# Patient Record
Sex: MALE | Race: WHITE | NOT HISPANIC OR LATINO | URBAN - METROPOLITAN AREA
[De-identification: names, ages, dates, MRNs, and addresses within clinical notes are randomized per-mention and may not be internally consistent; named-entity substitution may affect disease eponyms.]

---

## 2018-04-25 ENCOUNTER — OUTPATIENT (OUTPATIENT)
Dept: OUTPATIENT SERVICES | Facility: HOSPITAL | Age: 53
LOS: 1 days | End: 2018-04-25
Payer: COMMERCIAL

## 2018-04-25 ENCOUNTER — APPOINTMENT (OUTPATIENT)
Dept: ULTRASOUND IMAGING | Facility: CLINIC | Age: 53
End: 2018-04-25
Payer: COMMERCIAL

## 2018-04-25 DIAGNOSIS — Z00.8 ENCOUNTER FOR OTHER GENERAL EXAMINATION: ICD-10-CM

## 2018-04-25 PROCEDURE — 76700 US EXAM ABDOM COMPLETE: CPT

## 2018-04-25 PROCEDURE — 76700 US EXAM ABDOM COMPLETE: CPT | Mod: 26

## 2019-08-02 ENCOUNTER — APPOINTMENT (OUTPATIENT)
Dept: RHEUMATOLOGY | Facility: CLINIC | Age: 54
End: 2019-08-02
Payer: COMMERCIAL

## 2019-08-02 VITALS
OXYGEN SATURATION: 99 % | BODY MASS INDEX: 31.33 KG/M2 | DIASTOLIC BLOOD PRESSURE: 82 MMHG | HEIGHT: 75 IN | HEART RATE: 71 BPM | WEIGHT: 252 LBS | SYSTOLIC BLOOD PRESSURE: 120 MMHG

## 2019-08-02 DIAGNOSIS — Z82.61 FAMILY HISTORY OF ARTHRITIS: ICD-10-CM

## 2019-08-02 DIAGNOSIS — Z80.9 FAMILY HISTORY OF MALIGNANT NEOPLASM, UNSPECIFIED: ICD-10-CM

## 2019-08-02 DIAGNOSIS — Z82.49 FAMILY HISTORY OF ISCHEMIC HEART DISEASE AND OTHER DISEASES OF THE CIRCULATORY SYSTEM: ICD-10-CM

## 2019-08-02 DIAGNOSIS — E03.9 HYPOTHYROIDISM, UNSPECIFIED: ICD-10-CM

## 2019-08-02 PROCEDURE — 99204 OFFICE O/P NEW MOD 45 MIN: CPT

## 2019-08-02 RX ORDER — LEVOTHYROXINE SODIUM 0.17 MG/1
TABLET ORAL
Refills: 0 | Status: ACTIVE | COMMUNITY

## 2019-08-02 RX ORDER — INDOMETHACIN 50 MG/1
CAPSULE ORAL
Refills: 0 | Status: ACTIVE | COMMUNITY

## 2019-08-02 NOTE — ASSESSMENT
[FreeTextEntry1] : 55 y/o man with hx of hypothyroidism, gout previously on allopurinol but with adverse reaction/elevated liver transaminases.  He has been treating his gout flares with indomethacin PRN.  He does not want to start another urate lowering medication at this point, but we have discussed the possibility of uloric.  \par \par Plan:\par -Check labs including uric acid\par -XRs b/L feet to look for erosive change\par -Rx colchicine 0.6mg tablet to use only at onset of gout attack.  He has been educated on how to take this.  R/B/A's discussed.\par -Consider uloric, though I have informed him that hepatotoxicity may also occur and risks of drugs vs. benefits must be carefully considered.  We have also discussed the cardiovascular warning issued on uloric.  \par -Diet counseling employed\par -f/u 3 months

## 2019-08-02 NOTE — CONSULT LETTER
[Dear  ___] : Dear  [unfilled], [Consult Letter:] : I had the pleasure of evaluating your patient, [unfilled]. [Please see my note below.] : Please see my note below. [Consult Closing:] : Thank you very much for allowing me to participate in the care of this patient.  If you have any questions, please do not hesitate to contact me. [Sincerely,] : Sincerely, [FreeTextEntry3] : Dyana Gerardo MD

## 2019-08-02 NOTE — HISTORY OF PRESENT ILLNESS
[FreeTextEntry1] : This is a 55 y/o man with hx including thyroid disease, possible bursitis, and gout.\par \par He had been on allopurinol and messed up his liver.  He has scarring on his liver because of it.  He prefers not to be on a similar gout medication like allopurinol for now.  \par He gets gout every 6-8 weeks, and it goes away after 2 weeks.  He gets it in the right big toe and left big toe/dorsum, and right elbow.  Toes are fine between episodes.  \par When it comes on, he takes indomethacin for about 2 days and can usually nip it in the bud.  \par \par He has right knee drained in the past.  Does not think it was tested for crystals.  He received cortisone shot at that time.  \par \par \par AST/ALT was 170-180 range when he was on the allopurinol 100mg.  It was checked one year after starting allopurinol.  \par \par He denies alcohol\par He avoids red meat, shellfish\par \par He has intentional weight loss of 40-60 pounds.  \par \par No hx of eye inflammation\par No hyx of psoriasis/IBD

## 2019-08-12 ENCOUNTER — MEDICATION RENEWAL (OUTPATIENT)
Age: 54
End: 2019-08-12

## 2019-08-13 ENCOUNTER — OTHER (OUTPATIENT)
Age: 54
End: 2019-08-13

## 2019-10-07 ENCOUNTER — RX RENEWAL (OUTPATIENT)
Age: 54
End: 2019-10-07

## 2019-11-01 LAB
ALBUMIN SERPL ELPH-MCNC: 4.7 G/DL
ALP BLD-CCNC: 45 U/L
ALT SERPL-CCNC: 21 U/L
ANION GAP SERPL CALC-SCNC: 13 MMOL/L
AST SERPL-CCNC: 20 U/L
BASOPHILS # BLD AUTO: 0.04 K/UL
BASOPHILS NFR BLD AUTO: 0.9 %
BILIRUB SERPL-MCNC: 0.9 MG/DL
BUN SERPL-MCNC: 11 MG/DL
CALCIUM SERPL-MCNC: 9.8 MG/DL
CHLORIDE SERPL-SCNC: 103 MMOL/L
CO2 SERPL-SCNC: 25 MMOL/L
CREAT SERPL-MCNC: 1.03 MG/DL
EOSINOPHIL # BLD AUTO: 0.18 K/UL
EOSINOPHIL NFR BLD AUTO: 3.9 %
GLUCOSE SERPL-MCNC: 92 MG/DL
HCT VFR BLD CALC: 46.5 %
HGB BLD-MCNC: 15.2 G/DL
IMM GRANULOCYTES NFR BLD AUTO: 0.2 %
LYMPHOCYTES # BLD AUTO: 1.71 K/UL
LYMPHOCYTES NFR BLD AUTO: 37.3 %
MAN DIFF?: NORMAL
MCHC RBC-ENTMCNC: 30.3 PG
MCHC RBC-ENTMCNC: 32.7 GM/DL
MCV RBC AUTO: 92.8 FL
MONOCYTES # BLD AUTO: 0.37 K/UL
MONOCYTES NFR BLD AUTO: 8.1 %
NEUTROPHILS # BLD AUTO: 2.28 K/UL
NEUTROPHILS NFR BLD AUTO: 49.6 %
PLATELET # BLD AUTO: 206 K/UL
POTASSIUM SERPL-SCNC: 5.3 MMOL/L
PROT SERPL-MCNC: 7.3 G/DL
RBC # BLD: 5.01 M/UL
RBC # FLD: 13.1 %
SODIUM SERPL-SCNC: 141 MMOL/L
URATE SERPL-MCNC: 8.4 MG/DL
WBC # FLD AUTO: 4.59 K/UL

## 2019-11-08 ENCOUNTER — APPOINTMENT (OUTPATIENT)
Dept: RHEUMATOLOGY | Facility: CLINIC | Age: 54
End: 2019-11-08
Payer: COMMERCIAL

## 2019-11-08 VITALS
SYSTOLIC BLOOD PRESSURE: 132 MMHG | OXYGEN SATURATION: 99 % | BODY MASS INDEX: 31.46 KG/M2 | HEIGHT: 75 IN | DIASTOLIC BLOOD PRESSURE: 84 MMHG | WEIGHT: 253 LBS | HEART RATE: 55 BPM

## 2019-11-08 PROCEDURE — 99213 OFFICE O/P EST LOW 20 MIN: CPT

## 2019-11-08 NOTE — ASSESSMENT
[FreeTextEntry1] : 53 y/o man with hx of hypothyroidism, gout previously on allopurinol but with adverse reaction/elevated liver transaminases. He has been treating his gout flares with indomethacin PRN. He does not want to start another urate lowering medication at this point, but we again have discussed the possibility of uloric. \par \par Plan:\par -11/1/19 labs reviewed cbc, cmp WNL.  Uric acid is elevated at 8.4.  \par -XRs b/L feet to look for erosive change--> he still needs to do this.  I explained purpose of XR test.\par -Rx colchicine 0.6mg tablet to use only at onset of gout attack. He has been educated on how to take this. R/B/A's discussed.--> He has not used this yet either.  He states he will try it if his gout comes on during a saturday or sunday.  \par -Counseled on NSAID use and potential side effects\par -Consider uloric, though I have informed him that hepatotoxicity may also occur and risks of drugs vs. benefits must be carefully considered. We have also discussed the cardiovascular warning issued on uloric. \par -Diet counseling employed\par -f/u 4 months. \par \par

## 2019-11-08 NOTE — HISTORY OF PRESENT ILLNESS
[FreeTextEntry1] : This is a 55 y/o man with hx including thyroid disease, possible bursitis, and gout.\par \par He had been on allopurinol and messed up his liver. He has scarring on his liver because of it. He prefers not to be on a similar gout medication like allopurinol for now. \par He initially reported he had recurrent gout every 6-8 weeks, and it would go away after 2 weeks. He has had it in the right big toe and left big toe/dorsum, and right elbow. \par Since last visit 3 months ago, he has had left anterior knee bursa pain and inflammation, and left ankle/foot pain/swelling.  \par When it comes on, he takes indomethacin for about 2 days and can usually nip it in the bud. \par He did not try the colchicine as he was worried about diarrhea at work.  \par \par He has right knee drained in the past. Does not think it was tested for crystals. He received cortisone shot at that time. \par \par \par AST/ALT was 170-180 range when he was on the allopurinol 100mg. It was checked one year after starting allopurinol. \par His AST/ALT is currently normal.  \par \par He denies alcohol\par He avoids red meat, shellfish\par \par He has intentional weight loss of 40-60 pounds. \par \par No hx of eye inflammation\par No hyx of psoriasis/IBD \par  \par 11/1/19 CBC, CMP unremarkable\par Uric acid 8.4

## 2020-07-31 ENCOUNTER — APPOINTMENT (OUTPATIENT)
Dept: RHEUMATOLOGY | Facility: CLINIC | Age: 55
End: 2020-07-31
Payer: COMMERCIAL

## 2020-07-31 VITALS
WEIGHT: 258 LBS | DIASTOLIC BLOOD PRESSURE: 72 MMHG | SYSTOLIC BLOOD PRESSURE: 102 MMHG | HEIGHT: 75 IN | OXYGEN SATURATION: 98 % | BODY MASS INDEX: 32.08 KG/M2 | TEMPERATURE: 96.1 F | HEART RATE: 58 BPM

## 2020-07-31 PROCEDURE — 99214 OFFICE O/P EST MOD 30 MIN: CPT

## 2020-07-31 RX ORDER — COLCHICINE 0.6 MG/1
0.6 TABLET ORAL
Qty: 30 | Refills: 0 | Status: DISCONTINUED | COMMUNITY
Start: 2019-08-02 | End: 2020-07-31

## 2020-07-31 NOTE — HISTORY OF PRESENT ILLNESS
[FreeTextEntry1] : This is a 56 y/o man with hx including thyroid disease, possible bursitis, and gout.\par \par Since last visit he has had an attack right hand 2-4th MCPs, right knee, and then 2 days ago in his left foot instep.\par When it comes on, he takes indomethacin for about 2 days and can usually nip it in the bud. \par He did not try the colchicine as he was worried about diarrhea at work. \par He was started on uloric about 4 weeks ago by his pcp after a string of recurrent gout attacks.\par His uric acid most recently is now 5.7 7/2020.  \par \par \par He had previously been on allopurinol and reports he has scarring on his liver because of it. He preferred not to be on a similar gout medication initially when I first met him.\par He initially reported he had recurrent gout every 6-8 weeks, and it would go away after 2 weeks. \par He has right knee drained in the past. Does not think it was tested for crystals. He received cortisone shot at that time. \par \par \par AST/ALT was previously 170-180 range when he was on the allopurinol 100mg. It was checked one year after starting allopurinol. \par His AST/ALT is currently normal. \par \par He denies alcohol\par He avoids red meat, shellfish\par \par He has intentional weight loss of 40-60 pounds. \par \par No hx of eye inflammation\par No hyx of psoriasis/IBD \par  \par 11/1/19 CBC, CMP unremarkable\par Uric acid 8.4 \par  \par

## 2020-07-31 NOTE — ASSESSMENT
[FreeTextEntry1] : 54 y/o man with hx of hypothyroidism, gout previously on allopurinol but with adverse reaction/elevated liver transaminases. He has been treating his gout flares with indomethacin PRN.  He recently had another flareup, this time in left instep 2 days ago.  \par \par Plan:\par -Take colchicine BID today.  Take daily thereafter until gout attack resolved.  \par -Can use the indomethacin 75mg ER as needed, but cautioned him about liver and kidney as well as gastric effects.  \par -7/2020 albs reviewed, LFTs WNL.  Uric acid <6. \par -XRs b/L feet to look for erosive change--> he still needs to do this. I explained purpose of XR test.\par -Rx colchicine 0.6mg tablet to use only at onset of gout attack. He has been educated on how to take this. R/B/A's discussed.--> He has not used this yet either. He states he will try it if his gout comes on during a saturday or sunday. \par -Counseled on NSAID use and potential side effects\par -Continue uloric 40mg daily for now . Check uric acid again in 6 weeks.\par -Check LFTs again in 6 weeks given hepatic history.\par -US abd 7/13/20 reviewed.  Coarse echotexture of liver may relate to fatty infiltration or hepatocellular disease.  No significant changes compared to Abd US 5/3/2014. \par -Diet counseling employed\par -f/u 7 weeks\par \par

## 2020-09-17 LAB
ALBUMIN SERPL ELPH-MCNC: 5.1 G/DL
ALP BLD-CCNC: 46 U/L
ALT SERPL-CCNC: 55 U/L
ANION GAP SERPL CALC-SCNC: 13 MMOL/L
AST SERPL-CCNC: 33 U/L
BILIRUB SERPL-MCNC: 0.8 MG/DL
BUN SERPL-MCNC: 15 MG/DL
CALCIUM SERPL-MCNC: 9.9 MG/DL
CHLORIDE SERPL-SCNC: 102 MMOL/L
CO2 SERPL-SCNC: 25 MMOL/L
CREAT SERPL-MCNC: 1.02 MG/DL
GLUCOSE SERPL-MCNC: 92 MG/DL
POTASSIUM SERPL-SCNC: 4.8 MMOL/L
PROT SERPL-MCNC: 7.7 G/DL
SODIUM SERPL-SCNC: 140 MMOL/L
URATE SERPL-MCNC: 4.8 MG/DL

## 2020-09-18 ENCOUNTER — APPOINTMENT (OUTPATIENT)
Dept: RHEUMATOLOGY | Facility: CLINIC | Age: 55
End: 2020-09-18
Payer: COMMERCIAL

## 2020-09-18 ENCOUNTER — APPOINTMENT (OUTPATIENT)
Dept: RADIOLOGY | Facility: CLINIC | Age: 55
End: 2020-09-18
Payer: COMMERCIAL

## 2020-09-18 ENCOUNTER — OUTPATIENT (OUTPATIENT)
Dept: OUTPATIENT SERVICES | Facility: HOSPITAL | Age: 55
LOS: 1 days | End: 2020-09-18
Payer: COMMERCIAL

## 2020-09-18 VITALS
BODY MASS INDEX: 32.5 KG/M2 | WEIGHT: 260 LBS | TEMPERATURE: 97.9 F | DIASTOLIC BLOOD PRESSURE: 78 MMHG | SYSTOLIC BLOOD PRESSURE: 124 MMHG | HEART RATE: 63 BPM | OXYGEN SATURATION: 98 %

## 2020-09-18 DIAGNOSIS — Z00.8 ENCOUNTER FOR OTHER GENERAL EXAMINATION: ICD-10-CM

## 2020-09-18 PROCEDURE — 73130 X-RAY EXAM OF HAND: CPT | Mod: 26,50

## 2020-09-18 PROCEDURE — 73630 X-RAY EXAM OF FOOT: CPT

## 2020-09-18 PROCEDURE — 73630 X-RAY EXAM OF FOOT: CPT | Mod: 26,50

## 2020-09-18 PROCEDURE — 73130 X-RAY EXAM OF HAND: CPT

## 2020-09-18 PROCEDURE — 99214 OFFICE O/P EST MOD 30 MIN: CPT

## 2020-09-18 NOTE — HISTORY OF PRESENT ILLNESS
[FreeTextEntry1] : This is a 54 y/o man with hx including thyroid disease, possible bursitis, and gout.\par \par Since last visit he has had an attack right hand 2-4th MCPs after putting pool covers on.  He took colchicine and then another one an hour later. \par He took an indomethacin.\par After that it felt better.  He then had some activity in left 3rd MCP.  \par \par He denies any issue with colchicine.  \par most recent Uric acid was 4.8\par ALT was 55\par \par not to be on a similar gout medication initially when I first met him.\par He initially reported he had recurrent gout every 6-8 weeks, and it would go away after 2 weeks. \par He has right knee drained in the past. Does not think it was tested for crystals. He received cortisone shot at that time. \par \par \par AST/ALT was previously 170-180 range when he was on the allopurinol 100mg. It was checked one year after starting allopurinol. \par His AST/ALT is currently normal. \par \par He denies alcohol\par He avoids red meat, shellfish\par \par He has intentional weight loss of 40-60 pounds. \par \par No hx of eye inflammation\par No hyx of psoriasis/IBD \par  \par \par \par \par

## 2020-09-18 NOTE — ASSESSMENT
[FreeTextEntry1] : 54 y/o man with hx of hypothyroidism, gout previously on allopurinol but with adverse reaction/elevated liver transaminases. His gout frequency has improved thus far, though he is still getting attacks.  \par \par Plan:\par -Continue colchicine PRN use.  He does not want to use it daily at this point.   \par -Can use the indomethacin 75mg ER as needed, but cautioned him about liver and kidney as well as gastric effects. \par -9/16/20 labs reviewed, AST elevated. Uric acid <6. \par -XRs b/L hands and feet to look for erosive change--> he still needs to do this. I explained purpose of XR test.\par -Counseled on NSAID use and potential side effects\par -Continue uloric 40mg daily for now. Check uric acid again in 6 weeks.\par -Check LFTs again in 3 weeks given hepatic history.\par -US abd 7/13/20 reviewed. Coarse echotexture of liver may relate to fatty infiltration or hepatocellular disease. No significant changes compared to Abd US 5/3/2014. \par -Diet counseling employed\par -f/u 3 months in office\par -telephonic visit in 3.5 weeks (after labs and XRs done)\par \par . \par

## 2020-09-20 ENCOUNTER — TRANSCRIPTION ENCOUNTER (OUTPATIENT)
Age: 55
End: 2020-09-20

## 2020-10-15 ENCOUNTER — APPOINTMENT (OUTPATIENT)
Dept: RHEUMATOLOGY | Facility: CLINIC | Age: 55
End: 2020-10-15
Payer: COMMERCIAL

## 2020-10-15 LAB
25(OH)D3 SERPL-MCNC: 55.4 NG/ML
ALBUMIN SERPL ELPH-MCNC: 4.9 G/DL
ALP BLD-CCNC: 49 U/L
ALT SERPL-CCNC: 55 U/L
ANION GAP SERPL CALC-SCNC: 12 MMOL/L
AST SERPL-CCNC: 32 U/L
BILIRUB SERPL-MCNC: 0.6 MG/DL
BUN SERPL-MCNC: 12 MG/DL
CALCIUM SERPL-MCNC: 9.8 MG/DL
CHLORIDE SERPL-SCNC: 104 MMOL/L
CO2 SERPL-SCNC: 26 MMOL/L
CREAT SERPL-MCNC: 0.9 MG/DL
GLUCOSE SERPL-MCNC: 103 MG/DL
POTASSIUM SERPL-SCNC: 4.8 MMOL/L
PROT SERPL-MCNC: 7.7 G/DL
SODIUM SERPL-SCNC: 142 MMOL/L
URATE SERPL-MCNC: 6.9 MG/DL

## 2020-10-15 PROCEDURE — 99441: CPT

## 2020-12-18 ENCOUNTER — APPOINTMENT (OUTPATIENT)
Dept: RHEUMATOLOGY | Facility: CLINIC | Age: 55
End: 2020-12-18

## 2021-02-09 LAB
ALP BLD-CCNC: 47 U/L
ALT SERPL-CCNC: 57 U/L
AST SERPL-CCNC: 32 U/L
GGT SERPL-CCNC: 18 U/L
URATE SERPL-MCNC: 6.4 MG/DL

## 2021-03-16 LAB
ALT SERPL-CCNC: 47 U/L
AST SERPL-CCNC: 28 U/L

## 2021-04-20 ENCOUNTER — APPOINTMENT (OUTPATIENT)
Dept: RHEUMATOLOGY | Facility: CLINIC | Age: 56
End: 2021-04-20

## 2021-06-01 LAB
ALT SERPL-CCNC: 45 U/L
AST SERPL-CCNC: 27 U/L
URATE SERPL-MCNC: 6.2 MG/DL

## 2021-09-02 ENCOUNTER — RX RENEWAL (OUTPATIENT)
Age: 56
End: 2021-09-02

## 2021-10-22 LAB
ALBUMIN SERPL ELPH-MCNC: 4.9 G/DL
ALP BLD-CCNC: 51 U/L
ALT SERPL-CCNC: 52 U/L
ANION GAP SERPL CALC-SCNC: 14 MMOL/L
AST SERPL-CCNC: 29 U/L
BILIRUB SERPL-MCNC: 0.6 MG/DL
BUN SERPL-MCNC: 11 MG/DL
CALCIUM SERPL-MCNC: 9.6 MG/DL
CHLORIDE SERPL-SCNC: 103 MMOL/L
CO2 SERPL-SCNC: 23 MMOL/L
CREAT SERPL-MCNC: 0.98 MG/DL
GLUCOSE SERPL-MCNC: 85 MG/DL
POTASSIUM SERPL-SCNC: 4.9 MMOL/L
PROT SERPL-MCNC: 7.3 G/DL
SODIUM SERPL-SCNC: 140 MMOL/L
URATE SERPL-MCNC: 6 MG/DL

## 2021-11-16 ENCOUNTER — APPOINTMENT (OUTPATIENT)
Dept: RHEUMATOLOGY | Facility: CLINIC | Age: 56
End: 2021-11-16
Payer: COMMERCIAL

## 2021-11-16 VITALS
BODY MASS INDEX: 33.12 KG/M2 | HEART RATE: 67 BPM | WEIGHT: 265 LBS | DIASTOLIC BLOOD PRESSURE: 94 MMHG | TEMPERATURE: 97.9 F | SYSTOLIC BLOOD PRESSURE: 141 MMHG | OXYGEN SATURATION: 97 %

## 2021-11-16 DIAGNOSIS — E79.0 HYPERURICEMIA W/OUT SIGNS OF INFLAMMATORY ARTHRITIS AND TOPHACEOUS DISEASE: ICD-10-CM

## 2021-11-16 DIAGNOSIS — R74.01 ELEVATION OF LEVELS OF LIVER TRANSAMINASE LEVELS: ICD-10-CM

## 2021-11-16 PROCEDURE — 99214 OFFICE O/P EST MOD 30 MIN: CPT

## 2021-11-16 NOTE — HISTORY OF PRESENT ILLNESS
[FreeTextEntry1] : 55 y/o man with hx including thyroid disease, possible bursitis, and gout.\par \par He had an attack right 1st toe.  He took indomethacin 2 tablets total daily for 3 days and it resolved.  \par That was in october 2021.  Prior to that he hadn't had any attacks for almost a year, with the exception of possibly one small twinge.  \par \par He denies any issue with colchicine. \par most recent Uric acid was 4.8\par ALT was 55\par \par \par He initially reported he had recurrent gout every 6-8 weeks, and it would go away after 2 weeks. \par He has right knee drained in the past. Does not think it was tested for crystals. He received cortisone shot at that time. \par \par \par AST/ALT was previously 170-180 range when he was on the allopurinol 100mg. It was checked one year after starting allopurinol. \par \par He denies alcohol\par He avoids red meat, shellfish\par \par He has intentional weight loss of 40-60 pounds. \par \par No hx of eye inflammation\par No hyx of psoriasis/IBD \par  \par

## 2021-11-16 NOTE — ASSESSMENT
[FreeTextEntry1] : 55 y/o man with hx of hypothyroidism, gout previously on allopurinol but with adverse reaction/elevated liver transaminases. His gout frequency has improved thus far.  He had one flareup last month which resolved with indomethacin for 3 days.   \par \par Plan:\par -Continue colchicine PRN use. He does not want to use it daily at this point. \par -Can use the indomethacin 75mg ER as needed, but cautioned him about liver and kidney as well as gastric effects. \par -10/2021 labs reviewed, AST elevated. Uric acid is 6.  \par -XRs b/L hands and feet negative for erosive change.  \par -Counseled on NSAID use and potential side effects\par -Continue uloric 40mg daily for now. Check uric acid again in 6 weeks.\par -Check LFTs again. If LFTs improved, would inc uloric to MWFSunday.  If LFTs the same or higher (only because of recent singular flare), would continue at MWF.  \par -US abd 7/13/20 reviewed. Coarse echotexture of liver may relate to fatty infiltration or hepatocellular disease. No significant changes compared to Abd US 5/3/2014. \par -Diet counseling employed\par -f/u 6 months in office\par \par \par

## 2022-05-03 LAB
ALT SERPL-CCNC: 40 U/L
AST SERPL-CCNC: 26 U/L
URATE SERPL-MCNC: 5.2 MG/DL

## 2022-05-05 ENCOUNTER — APPOINTMENT (OUTPATIENT)
Dept: RHEUMATOLOGY | Facility: CLINIC | Age: 57
End: 2022-05-05
Payer: COMMERCIAL

## 2022-05-05 VITALS
DIASTOLIC BLOOD PRESSURE: 80 MMHG | SYSTOLIC BLOOD PRESSURE: 120 MMHG | BODY MASS INDEX: 32.08 KG/M2 | HEART RATE: 62 BPM | OXYGEN SATURATION: 96 % | WEIGHT: 258 LBS | TEMPERATURE: 97.3 F | HEIGHT: 75 IN

## 2022-05-05 PROCEDURE — 99214 OFFICE O/P EST MOD 30 MIN: CPT

## 2022-05-05 RX ORDER — INDOMETHACIN 75 MG/1
75 CAPSULE, EXTENDED RELEASE ORAL
Qty: 30 | Refills: 0 | Status: ACTIVE | COMMUNITY
Start: 2020-07-31 | End: 1900-01-01

## 2022-05-05 NOTE — ASSESSMENT
[FreeTextEntry1] : 55 y/o man with hx of hypothyroidism, gout previously on allopurinol but with adverse reaction/elevated liver transaminases. He has been treating his gout flares with indomethacin PRN. He recently had another a very tiny twinge in left 1st toe.  \par \par \par Plan:\par -Continue colchicine PRN use. He does not want to use it daily at this point. \par -Refill the indomethacin 75mg ER as needed, but cautioned him about liver and kidney as well as gastric effects. \par -10/2021 labs reviewed, AST elevated. Uric acid is 5.2. \par -XRs b/L hands and feet negative for erosive change. \par -Counseled on NSAID use and potential side effects\par -Continue uloric 40mg QOD for now. \par -US abd 7/13/20 reviewed. Coarse echotexture of liver may relate to fatty infiltration or hepatocellular disease. No significant changes compared to Abd US 5/3/2014. \par -Diet counseling employed\par -Advised podiatry for the hammartoe\par -f/u 6 months in office

## 2022-05-05 NOTE — HISTORY OF PRESENT ILLNESS
[FreeTextEntry1] : 57 y/o man with hx including thyroid disease, possible bursitis, and gout.\par \par He had a twinge left 1st toe a few weeks ago.  He took indomethacin 2 tablets total daily for 1 day and it resolved.    Prior to that he hadn't had anything for 6 months.\par Admits he had had steak at [facility]. \par \par He initially reported he had recurrent gout every 6-8 weeks, and it would go away after 2 weeks. \par He has right knee drained in the past. Does not think it was tested for crystals. He received cortisone shot at that time. \par \par \par He denies alcohol\par He avoids red meat, shellfish\par \par He has intentional weight loss of 40-60 pounds. \par \par No hx of eye inflammation\par No hyx of psoriasis/IBD \par  \par

## 2022-07-31 ENCOUNTER — EMERGENCY (EMERGENCY)
Facility: HOSPITAL | Age: 57
LOS: 0 days | Discharge: HOME | End: 2022-07-31
Attending: EMERGENCY MEDICINE
Payer: COMMERCIAL

## 2022-07-31 VITALS — HEIGHT: 72 IN | WEIGHT: 220.02 LBS

## 2022-07-31 VITALS
HEART RATE: 67 BPM | TEMPERATURE: 98 F | SYSTOLIC BLOOD PRESSURE: 143 MMHG | OXYGEN SATURATION: 99 % | DIASTOLIC BLOOD PRESSURE: 89 MMHG | RESPIRATION RATE: 18 BRPM

## 2022-07-31 DIAGNOSIS — R06.02 SHORTNESS OF BREATH: ICD-10-CM

## 2022-07-31 DIAGNOSIS — R07.89 OTHER CHEST PAIN: ICD-10-CM

## 2022-07-31 DIAGNOSIS — Z20.822 CONTACT WITH AND (SUSPECTED) EXPOSURE TO COVID-19: ICD-10-CM

## 2022-07-31 LAB
ALBUMIN SERPL ELPH-MCNC: 4.2 G/DL — SIGNIFICANT CHANGE UP (ref 3.3–5)
ALP SERPL-CCNC: 45 U/L — SIGNIFICANT CHANGE UP (ref 40–120)
ALT FLD-CCNC: 54 U/L — SIGNIFICANT CHANGE UP (ref 12–78)
ANION GAP SERPL CALC-SCNC: 2 MMOL/L — LOW (ref 5–17)
AST SERPL-CCNC: 27 U/L — SIGNIFICANT CHANGE UP (ref 15–37)
BASOPHILS # BLD AUTO: 0.03 K/UL — SIGNIFICANT CHANGE UP (ref 0–0.2)
BASOPHILS NFR BLD AUTO: 0.6 % — SIGNIFICANT CHANGE UP (ref 0–2)
BILIRUB SERPL-MCNC: 0.6 MG/DL — SIGNIFICANT CHANGE UP (ref 0.2–1.2)
BUN SERPL-MCNC: 12 MG/DL — SIGNIFICANT CHANGE UP (ref 7–23)
CALCIUM SERPL-MCNC: 9.4 MG/DL — SIGNIFICANT CHANGE UP (ref 8.5–10.1)
CHLORIDE SERPL-SCNC: 111 MMOL/L — HIGH (ref 96–108)
CO2 SERPL-SCNC: 27 MMOL/L — SIGNIFICANT CHANGE UP (ref 22–31)
CREAT SERPL-MCNC: 0.99 MG/DL — SIGNIFICANT CHANGE UP (ref 0.5–1.3)
D DIMER BLD IA.RAPID-MCNC: 235 NG/ML DDU — HIGH
EGFR: 88 ML/MIN/1.73M2 — SIGNIFICANT CHANGE UP
EOSINOPHIL # BLD AUTO: 0.18 K/UL — SIGNIFICANT CHANGE UP (ref 0–0.5)
EOSINOPHIL NFR BLD AUTO: 3.6 % — SIGNIFICANT CHANGE UP (ref 0–6)
GLUCOSE SERPL-MCNC: 118 MG/DL — HIGH (ref 70–99)
HCT VFR BLD CALC: 46.7 % — SIGNIFICANT CHANGE UP (ref 39–50)
HGB BLD-MCNC: 15.7 G/DL — SIGNIFICANT CHANGE UP (ref 13–17)
IMM GRANULOCYTES NFR BLD AUTO: 0.4 % — SIGNIFICANT CHANGE UP (ref 0–1.5)
LYMPHOCYTES # BLD AUTO: 1.45 K/UL — SIGNIFICANT CHANGE UP (ref 1–3.3)
LYMPHOCYTES # BLD AUTO: 29.3 % — SIGNIFICANT CHANGE UP (ref 13–44)
MCHC RBC-ENTMCNC: 30.7 PG — SIGNIFICANT CHANGE UP (ref 27–34)
MCHC RBC-ENTMCNC: 33.6 GM/DL — SIGNIFICANT CHANGE UP (ref 32–36)
MCV RBC AUTO: 91.4 FL — SIGNIFICANT CHANGE UP (ref 80–100)
MONOCYTES # BLD AUTO: 0.32 K/UL — SIGNIFICANT CHANGE UP (ref 0–0.9)
MONOCYTES NFR BLD AUTO: 6.5 % — SIGNIFICANT CHANGE UP (ref 2–14)
NEUTROPHILS # BLD AUTO: 2.95 K/UL — SIGNIFICANT CHANGE UP (ref 1.8–7.4)
NEUTROPHILS NFR BLD AUTO: 59.6 % — SIGNIFICANT CHANGE UP (ref 43–77)
NT-PROBNP SERPL-SCNC: 36 PG/ML — SIGNIFICANT CHANGE UP (ref 0–125)
PLATELET # BLD AUTO: 227 K/UL — SIGNIFICANT CHANGE UP (ref 150–400)
POTASSIUM SERPL-MCNC: 5.3 MMOL/L — SIGNIFICANT CHANGE UP (ref 3.5–5.3)
POTASSIUM SERPL-SCNC: 5.3 MMOL/L — SIGNIFICANT CHANGE UP (ref 3.5–5.3)
PROT SERPL-MCNC: 8 GM/DL — SIGNIFICANT CHANGE UP (ref 6–8.3)
RBC # BLD: 5.11 M/UL — SIGNIFICANT CHANGE UP (ref 4.2–5.8)
RBC # FLD: 12.9 % — SIGNIFICANT CHANGE UP (ref 10.3–14.5)
SODIUM SERPL-SCNC: 140 MMOL/L — SIGNIFICANT CHANGE UP (ref 135–145)
TROPONIN I, HIGH SENSITIVITY RESULT: 4.76 NG/L — SIGNIFICANT CHANGE UP
WBC # BLD: 4.95 K/UL — SIGNIFICANT CHANGE UP (ref 3.8–10.5)
WBC # FLD AUTO: 4.95 K/UL — SIGNIFICANT CHANGE UP (ref 3.8–10.5)

## 2022-07-31 PROCEDURE — 99285 EMERGENCY DEPT VISIT HI MDM: CPT | Mod: 25

## 2022-07-31 PROCEDURE — 85025 COMPLETE CBC W/AUTO DIFF WBC: CPT

## 2022-07-31 PROCEDURE — 0241U: CPT

## 2022-07-31 PROCEDURE — 71046 X-RAY EXAM CHEST 2 VIEWS: CPT | Mod: 26

## 2022-07-31 PROCEDURE — 36415 COLL VENOUS BLD VENIPUNCTURE: CPT

## 2022-07-31 PROCEDURE — 83880 ASSAY OF NATRIURETIC PEPTIDE: CPT

## 2022-07-31 PROCEDURE — 99285 EMERGENCY DEPT VISIT HI MDM: CPT

## 2022-07-31 PROCEDURE — 93010 ELECTROCARDIOGRAM REPORT: CPT

## 2022-07-31 PROCEDURE — 93005 ELECTROCARDIOGRAM TRACING: CPT

## 2022-07-31 PROCEDURE — 84484 ASSAY OF TROPONIN QUANT: CPT

## 2022-07-31 PROCEDURE — 80053 COMPREHEN METABOLIC PANEL: CPT

## 2022-07-31 PROCEDURE — 85379 FIBRIN DEGRADATION QUANT: CPT

## 2022-07-31 PROCEDURE — 71046 X-RAY EXAM CHEST 2 VIEWS: CPT

## 2022-07-31 NOTE — ED STATDOCS - NS ED ROS FT
Constitutional: nad, well appearing  HEENT:  no nasal congestion, eye drainage or ear pain.    CVS:  +cp  Resp:  +sob, no cough  GI:  no abdominal pain, no nausea or vomiting  :  no dysuria  MSK: no joint pain or limited ROM  Skin: no rash  Neuro: no change in mental status or level of consciousness  Heme/lymph: no bleeding

## 2022-07-31 NOTE — ED STATDOCS - OBJECTIVE STATEMENT
56 y/o male presents to the ED c/o persistent central  cp and sob since this morning. No fevers, chills, coughing. no hx of blood clots. no recent travel. Has appointment with cardiologist on Friday.

## 2022-07-31 NOTE — ED ADULT NURSE NOTE - OBJECTIVE STATEMENT
Pt reports feeling chest tightness and feeling SOB overnight into this am. Denies SOB at this time. Resp even and unlabored. Pt denies f/c or sick contacts.

## 2022-07-31 NOTE — ED STATDOCS - PATIENT PORTAL LINK FT
You can access the FollowMyHealth Patient Portal offered by [facility] by registering at the following website: [email or web address]. By joining [facility]’s FollowMyHealth portal, you will also be able to view your health information using other applications (apps) compatible with our system.

## 2022-07-31 NOTE — ED ADULT TRIAGE NOTE - CHIEF COMPLAINT QUOTE
Pt. to the ED BIB Wife C/O Chest Pain with SOB Since Yesterday Morning- Hx of Thyroid and Gout - EKG

## 2022-07-31 NOTE — ED STATDOCS - CARE PROVIDER_API CALL
Trent Isaac)  Cardiovascular Disease; Internal Medicine  [address], [address]  [place] [address]  Phone: [phone]  Fax: [phone]  Established Patient  Follow Up Time:

## 2022-07-31 NOTE — ED STATDOCS - CLINICAL SUMMARY MEDICAL DECISION MAKING FREE TEXT BOX
Story not suspicious for acs.  Low risk HEART score.  EKG unremarkable.  Trop x1 negative - sufficient for eval given  timing  of symptoms.  Low risk PE and dimer negative.  No e/o ptx/pna/carditis.  Story not c/w dissection - below threshold for further w/u.  Stable for outpatient f/u.  D/c home with strict return precautions and prompt outpatient f/u. Story not suspicious for acs.  Low risk HEART score.  EKG unremarkable.  Trop x1 negative - sufficient for eval given  timing  of symptoms.  Low risk PE and dimer negative.  No e/o ptx/pna/carditis.  Story not c/w dissection - below threshold for further w/u.  Stable for outpatient f/u.  D/c home with strict return precautions and prompt outpatient f/u.        Mildly elevated D-Dimer, CXR and remained of labs unremarkable.  Corrected Dimer with age no CTA.  Pt. aware of labs, EKG, CXR and will follow with his cardiologist this week.  Return precautions discussed.  Yessy Hanson PA-C Story not suspicious for acs.  Low risk HEART score.  EKG unremarkable.  Trop x1 negative - sufficient for eval given  timing  of symptoms.  Low risk PE and age adjusted dimer negative.  No e/o ptx/pna/carditis.  Story not c/w dissection - below threshold for further w/u.  Stable for outpatient f/u.  D/c home with strict return precautions and prompt outpatient f/u.        Mildly elevated D-Dimer, CXR and remained of labs unremarkable.  Corrected Dimer with age no CTA.  Pt. aware of labs, EKG, CXR and will follow with his cardiologist this week.  Return precautions discussed.  Yessy Hanson PA-C

## 2022-07-31 NOTE — ED STATDOCS - PROGRESS NOTE DETAILS
58 y/o male presents to the ED c/o persistent central  cp and sob since this morning. No fevers, chills, coughing. no hx of blood clots. no recent travel. Has appointment with cardiologist on Friday. Pt. is a 57 year old male Hx hypothyrodism, Gout, presnts with chest pain, SOB since yesterday morning. Pt. is a 57 year old male Hx hypothyroid, Gout, presents with chest pain, SOB since yesterday morning.  Pt. denies cough, fever, chills.  Neg. history of clotting disorder or clots.  Pt. followed with Dr. Isaac.  Due to see him this week.  Pt. had normal stress test six months ago.  Yessy Hanson PA-C Mildly elevated D-Dimer, CXR and remained of labs unremarkable.  Corrected Dimer with age no CTA.  Pt. aware of labs, EKG, CXR and will follow with his cardiologist this week.  Return precautions discussed.  Yessy Hanson PA-C

## 2022-07-31 NOTE — ED STATDOCS - NSCAREINITIATED _GEN_ER
"Chief Complaint   Patient presents with     Urgent Care     Otalgia     Possible ear infection- cold x10 days, cough. Hx or recurring ear infection       Initial Pulse 134  Temp 98.4  F (36.9  C) (Tympanic)  Wt 27 lb (12.2 kg)  SpO2 98% Estimated body mass index is 20.49 kg/(m^2) as calculated from the following:    Height as of 12/29/17: 2' 7\" (0.787 m).    Weight as of 12/29/17: 28 lb (12.7 kg).  Medication Reconciliation: complete     Marquita Meredith CMA (AAMA)        "
Arsenio Lucio(Attending)

## 2022-07-31 NOTE — ED STATDOCS - NS ED ATTENDING STATEMENT MOD
This was a shared visit with the [facility]. I reviewed and verified the documentation and independently performed the documented:

## 2022-10-03 ENCOUNTER — RX RENEWAL (OUTPATIENT)
Age: 57
End: 2022-10-03

## 2022-11-03 ENCOUNTER — APPOINTMENT (OUTPATIENT)
Dept: RHEUMATOLOGY | Facility: CLINIC | Age: 57
End: 2022-11-03

## 2022-11-03 PROCEDURE — 99214 OFFICE O/P EST MOD 30 MIN: CPT | Mod: 95

## 2022-11-03 RX ORDER — COLCHICINE 0.6 MG/1
0.6 CAPSULE ORAL
Qty: 30 | Refills: 1 | Status: DISCONTINUED | COMMUNITY
Start: 2019-08-12 | End: 2022-11-03

## 2022-11-07 LAB
ALBUMIN SERPL ELPH-MCNC: 4.6 G/DL
ALP BLD-CCNC: 50 U/L
ALT SERPL-CCNC: 39 U/L
ANION GAP SERPL CALC-SCNC: 12 MMOL/L
AST SERPL-CCNC: 26 U/L
BILIRUB SERPL-MCNC: 0.8 MG/DL
BUN SERPL-MCNC: 14 MG/DL
CALCIUM SERPL-MCNC: 9.3 MG/DL
CHLORIDE SERPL-SCNC: 103 MMOL/L
CO2 SERPL-SCNC: 24 MMOL/L
CREAT SERPL-MCNC: 1.01 MG/DL
EGFR: 86 ML/MIN/1.73M2
GLUCOSE SERPL-MCNC: 98 MG/DL
POTASSIUM SERPL-SCNC: 4.3 MMOL/L
PROT SERPL-MCNC: 7.1 G/DL
SODIUM SERPL-SCNC: 139 MMOL/L
URATE SERPL-MCNC: 6.3 MG/DL

## 2022-11-07 NOTE — HISTORY OF PRESENT ILLNESS
[FreeTextEntry1] : Verbal consent was obtained from patient for 2 way video telehealth visit.  \par Patient was located at his home in [place].  Provider was located at [address] [place] [address]\par \par \par 58 y/o man with hx including thyroid disease, possible bursitis, and gout.\par \par \par Right foot, had a twinge.  But it was covid . \par Denies any new medical diagnoses, medications, allergies.  \par \par He initially reported he had recurrent gout every 6-8 weeks, and it would go away after 2 weeks. \par He has right knee drained in the past. Does not think it was tested for crystals. He received cortisone shot at that time. \par \par \par He denies alcohol\par He avoids red meat, shellfish\par \par No hx of eye inflammation\par No hyx of psoriasis/IBD \par  \par \par \par \par

## 2022-11-07 NOTE — ASSESSMENT
[FreeTextEntry1] : 55 y/o man with hx of hypothyroidism, gout previously on allopurinol but with adverse reaction/elevated liver transaminases. He has been treating his gout flares with indomethacin PRN. He recently had another a very tiny twinge in left 1st toe. \par \par \par Plan:\par -now off chronic colchicine.  \par -Only takes indomethacin 75mg ER as needed, but cautioned him about liver and kidney as well as gastric effects. \par -10/2021 labs reviewed, AST elevated. Uric acid is 5.2. \par -XRs b/L hands and feet negative for erosive change. \par -Counseled on NSAID use and potential side effects\par -Continue uloric 40mg QOD for now. \par -US abd 7/13/20 reviewed. Coarse echotexture of liver may relate to fatty infiltration or hepatocellular disease. No significant changes compared to Abd US 5/3/2014. \par -Diet counseling employed\par -Advised podiatry for the [facility]art[facility]\par -f/u 6 months- 1 year depending on labwork.  \par

## 2022-11-08 ENCOUNTER — NON-APPOINTMENT (OUTPATIENT)
Age: 57
End: 2022-11-08

## 2022-12-20 ENCOUNTER — EMERGENCY (EMERGENCY)
Facility: HOSPITAL | Age: 57
LOS: 0 days | Discharge: HOME | End: 2022-12-20
Attending: EMERGENCY MEDICINE
Payer: COMMERCIAL

## 2022-12-20 VITALS
TEMPERATURE: 98 F | RESPIRATION RATE: 19 BRPM | HEART RATE: 59 BPM | SYSTOLIC BLOOD PRESSURE: 140 MMHG | DIASTOLIC BLOOD PRESSURE: 87 MMHG | OXYGEN SATURATION: 100 %

## 2022-12-20 VITALS — HEIGHT: 75 IN | WEIGHT: 265 LBS
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
## 2022-12-20 DIAGNOSIS — E03.9 HYPOTHYROIDISM, UNSPECIFIED: ICD-10-CM

## 2022-12-20 DIAGNOSIS — S01.81XA LACERATION WITHOUT FOREIGN BODY OF OTHER PART OF HEAD, INITIAL ENCOUNTER: ICD-10-CM

## 2022-12-20 DIAGNOSIS — I10 ESSENTIAL (PRIMARY) HYPERTENSION: ICD-10-CM
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
## 2022-12-20 DIAGNOSIS — Z23 ENCOUNTER FOR IMMUNIZATION: ICD-10-CM
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
## 2022-12-20 PROBLEM — Z78.9 OTHER SPECIFIED HEALTH STATUS: Chronic | Status: ACTIVE | Noted: 2022-08-01

## 2022-12-20 PROCEDURE — 70450 CT HEAD/BRAIN W/O DYE: CPT | Mod: 26,MA

## 2022-12-20 PROCEDURE — 90715 TDAP VACCINE 7 YRS/> IM: CPT

## 2022-12-20 PROCEDURE — 12013 RPR F/E/E/N/L/M 2.6-5.0 CM: CPT

## 2022-12-20 PROCEDURE — 90471 IMMUNIZATION ADMIN: CPT

## 2022-12-20 PROCEDURE — 70450 CT HEAD/BRAIN W/O DYE: CPT | Mod: MA

## 2022-12-20 PROCEDURE — 99284 EMERGENCY DEPT VISIT MOD MDM: CPT | Mod: 25

## 2022-12-20 RX ORDER — TETANUS TOXOID, REDUCED DIPHTHERIA TOXOID AND ACELLULAR PERTUSSIS VACCINE, ADSORBED 5; 2.5; 8; 8; 2.5 [IU]/.5ML; [IU]/.5ML; UG/.5ML; UG/.5ML; UG/.5ML
0.5 SUSPENSION INTRAMUSCULAR ONCE
Refills: 0 | Status: COMPLETED | OUTPATIENT
Start: 2022-12-20 | End: 2022-12-20

## 2022-12-20 RX ADMIN — TETANUS TOXOID, REDUCED DIPHTHERIA TOXOID AND ACELLULAR PERTUSSIS VACCINE, ADSORBED 0.5 MILLILITER(S): 5; 2.5; 8; 8; 2.5 SUSPENSION INTRAMUSCULAR at 16:09

## 2022-12-20 NOTE — ED STATDOCS - EYES, MLM
clear bilaterally.  Pupils equal, round, and reactive to light. clear bilaterally.  Pupils equal, round, and reactive to light.  No pain with EOM

## 2022-12-20 NOTE — ED STATDOCS - PROGRESS NOTE DETAILS
pt aware of ct results, states he has his right middle ear removed as a child bc he stapped himself in the ear with a pencil. pt aware of ct finding chronic in nature. pt aware to return in 5-7 days for suture removal and agrees with plan. pt well appearing on dc. -Sofiya Mcfarlane PA-C

## 2022-12-20 NOTE — ED STATDOCS - NSFOLLOWUPINSTRUCTIONS_ED_ALL_ED_FT
Laceration    A laceration is a cut that goes through all of the layers of the skin and into the tissue that is right under the skin. Some lacerations heal on their own. Others need to be closed with skin adhesive strips, skin glue, stitches (sutures), or staples. Proper laceration care minimizes the risk of infection and helps the laceration to heal better.  If non-absorbable stitches or staples have been placed, they must be taken out within the time frame instructed by your healthcare provider.    SEEK IMMEDIATE MEDICAL CARE IF YOU HAVE ANY OF THE FOLLOWING SYMPTOMS: swelling around the wound, worsening pain, drainage from the wound, red streaking going away from your wound, inability to move finger or toe near the laceration, or discoloration of skin near the laceration.      remove sutures in 5-7 days  keep area clean and dry  do not wet first 24 hours  let air dry at night and covered during the day

## 2022-12-20 NOTE — ED ADULT TRIAGE NOTE - CHIEF COMPLAINT QUOTE
pt ambulatory to triage s/p laceration to forehead from metal elena at work. -loc -blood thinner -allergies hx hypothyroidism, gout

## 2022-12-20 NOTE — ED STATDOCS - SKIN, MLM
skin normal color for race, warm, dry and intact. L shaped cut to forehead, no ttp of scalp or ttp to neck skin normal color for race, warm. L shaped cut to forehead, no ttp of scalp or ttp to neck, no pedrito facial bones

## 2022-12-20 NOTE — ED STATDOCS - OBJECTIVE STATEMENT
58 y/o M with a PMhx of HTN on atorvastatin and hypothyroidism on levothyroxin presents to the ED c/o laceration and bleeding s/p facial injury. pt states he was at work jacking up a piece of metal with high pressure when the elena slipped and struck the pt on the middle of his forehead. TDAP not UTD. Not on ACs. Denies LOC, HA, or vomiting.

## 2022-12-20 NOTE — ED STATDOCS - PATIENT PORTAL LINK FT
You can access the FollowMyHealth Patient Portal offered by [facility] by registering at the following website: [email or web address]. By joining [facility]’s FollowMyHealth portal, you will also be able to view your health information using other applications (apps) compatible with our system.

## 2022-12-20 NOTE — ED STATDOCS - ATTENDING APP SHARED VISIT CONTRIBUTION OF CARE
I, Kishore Chao MD,  performed the initial face to face bedside interview with this patient regarding history of present illness, review of symptoms and relevant past medical, social and family history.  I completed an independent physical examination.  I was the initial provider who evaluated this patient.   I personally saw the patient and performed a substantive portion of the visit including all aspects of the medical decision making.  I have signed out the follow up of any pending tests (i.e. labs, radiological studies) to the [facility].  I have communicated the patient’s plan of care and disposition with the [facility].  The history, relevant review of systems, past medical and surgical history, medical decision making, and physical examination was documented by the scribe in my presence and I attest to the accuracy of the documentation.

## 2023-01-26 ENCOUNTER — RX RENEWAL (OUTPATIENT)
Age: 58
End: 2023-01-26

## 2023-05-04 ENCOUNTER — APPOINTMENT (OUTPATIENT)
Dept: RHEUMATOLOGY | Facility: CLINIC | Age: 58
End: 2023-05-04
Payer: COMMERCIAL

## 2023-05-04 VITALS
HEART RATE: 68 BPM | WEIGHT: 261 LBS | OXYGEN SATURATION: 8 % | BODY MASS INDEX: 32.62 KG/M2 | DIASTOLIC BLOOD PRESSURE: 78 MMHG | SYSTOLIC BLOOD PRESSURE: 118 MMHG | TEMPERATURE: 98 F

## 2023-05-04 PROCEDURE — 99214 OFFICE O/P EST MOD 30 MIN: CPT

## 2023-05-04 NOTE — ASSESSMENT
[FreeTextEntry1] : 58 y/o man with hx of hypothyroidism, gout previously on allopurinol but with adverse reaction/elevated liver transaminases. He has been treating his gout flares with indomethacin PRN. He recently had another a very tiny twinge in left 1st toe. \par \par \par Plan:\par -off colchicine\par -Refill the indomethacin 75mg ER as needed, but cautioned him about liver and kidney as well as gastric effects. \par -new labs due now\par -XRs b/L hands and feet negative for erosive change. \par -Counseled on NSAID use and potential side effects\par -Continue uloric 40mg QOD for now.  Had elevated LFTs at daily dosing.  \par -US abd 7/13/20 reviewed. Coarse echotexture of liver may relate to fatty infiltration or hepatocellular disease. No significant changes compared to Abd US 5/3/2014. \par -Diet counseling employed\par -Advised podiatry for the [facility]\par -f/u 9 months in office. \par \par  \par

## 2023-05-04 NOTE — HISTORY OF PRESENT ILLNESS
[FreeTextEntry1] : 56 y/o man with hx including thyroid disease, possible bursitis, and gout.\par \par She is taking uloric 40mg M/W/F. \par \par Stress test was good.  \par \par Left knee had a little aching, but it went away.  No flareups or gout attacks that he could speak of.  \par \par \par Denies any new medical diagnoses, medications, allergies. \par \par He initially reported he had recurrent gout every 6-8 weeks, and it would go away after 2 weeks. \par He has right knee drained in the past. Does not think it was tested for crystals. He received cortisone shot at that time. \par \par \par He denies alcohol\par He avoids red meat, shellfish\par \par No hx of eye inflammation\par No hyx of psoriasis/IBD \par  \par \par

## 2023-06-04 ENCOUNTER — EMERGENCY (EMERGENCY)
Facility: HOSPITAL | Age: 58
LOS: 0 days | Discharge: HOME | End: 2023-06-04
Attending: EMERGENCY MEDICINE
Payer: COMMERCIAL

## 2023-06-04 VITALS
OXYGEN SATURATION: 97 % | DIASTOLIC BLOOD PRESSURE: 88 MMHG | HEART RATE: 54 BPM | WEIGHT: 259.93 LBS | HEIGHT: 75 IN | SYSTOLIC BLOOD PRESSURE: 139 MMHG | RESPIRATION RATE: 19 BRPM | TEMPERATURE: 98 F

## 2023-06-04 VITALS
HEART RATE: 55 BPM | RESPIRATION RATE: 18 BRPM | DIASTOLIC BLOOD PRESSURE: 80 MMHG | TEMPERATURE: 98 F | OXYGEN SATURATION: 98 % | SYSTOLIC BLOOD PRESSURE: 140 MMHG

## 2023-06-04 DIAGNOSIS — M10.9 GOUT, UNSPECIFIED: ICD-10-CM

## 2023-06-04 DIAGNOSIS — M25.421 EFFUSION, RIGHT ELBOW: ICD-10-CM

## 2023-06-04 DIAGNOSIS — L03.113 CELLULITIS OF RIGHT UPPER LIMB: ICD-10-CM

## 2023-06-04 DIAGNOSIS — M25.521 PAIN IN RIGHT ELBOW: ICD-10-CM

## 2023-06-04 PROCEDURE — 99284 EMERGENCY DEPT VISIT MOD MDM: CPT

## 2023-06-04 PROCEDURE — 99284 EMERGENCY DEPT VISIT MOD MDM: CPT | Mod: 25

## 2023-06-04 PROCEDURE — 96374 THER/PROPH/DIAG INJ IV PUSH: CPT

## 2023-06-04 PROCEDURE — 73080 X-RAY EXAM OF ELBOW: CPT | Mod: 26,RT

## 2023-06-04 PROCEDURE — 73080 X-RAY EXAM OF ELBOW: CPT | Mod: RT

## 2023-06-04 RX ORDER — CEFTRIAXONE 500 MG/1
1000 INJECTION, POWDER, FOR SOLUTION INTRAMUSCULAR; INTRAVENOUS ONCE
Refills: 0 | Status: COMPLETED | OUTPATIENT
Start: 2023-06-04 | End: 2023-06-04

## 2023-06-04 RX ORDER — CEFTRIAXONE 500 MG/1
1000 INJECTION, POWDER, FOR SOLUTION INTRAMUSCULAR; INTRAVENOUS ONCE
Refills: 0 | Status: DISCONTINUED | OUTPATIENT
Start: 2023-06-04 | End: 2023-06-04

## 2023-06-04 RX ADMIN — CEFTRIAXONE 1000 MILLIGRAM(S): 500 INJECTION, POWDER, FOR SOLUTION INTRAMUSCULAR; INTRAVENOUS at 10:12

## 2023-06-04 NOTE — ED ADULT NURSE NOTE - NSFALLUNIVINTERV_ED_ALL_ED
Bed/Stretcher in lowest position, wheels locked, appropriate side rails in place/Call bell, personal items and telephone in reach/Instruct patient to call for assistance before getting out of bed/chair/stretcher/Non-slip footwear applied when patient is off stretcher/[place] to call system/Physically safe environment - no spills, clutter or unnecessary equipment/Purposeful proactive rounding/Room/bathroom lighting operational, light cord in reach

## 2023-06-04 NOTE — ED PROVIDER NOTE - NSFOLLOWUPINSTRUCTIONS_ED_ALL_ED_FT
Return to the Emergency Department for worsening or persistent symptoms, and/or ANY NEW OR CONCERNING SYMPTOMS. If you have issues obtaining follow up, please call: [phone] or [phone]  to obtain a doctor or specialist who takes your insurance in your area.     Cellulitis    WHAT YOU NEED TO KNOW:    Cellulitis is a skin infection caused by bacteria. Cellulitis may go away on its own or you may need treatment. Your healthcare provider may draw a [place] around the outside edges of your cellulitis. If your cellulitis spreads, your healthcare provider will see it outside of the [place]. Cellulitis          DISCHARGE INSTRUCTIONS:    Call [phone] if:     You have sudden trouble breathing or chest pain.        Return to the emergency department if:     Your wound gets larger and more painful.       You feel a crackling under your skin when you touch it.      You have purple dots or bumps on your skin, or you see bleeding under your skin.      You have new swelling and pain in your legs.      The red, warm, swollen area gets larger.      You see red streaks coming from the infected area.    Contact your healthcare provider if:     You have a fever.      Your fever or pain does not go away or gets worse.      The area does not get smaller after 2 days of antibiotics.      Your skin is flaking or peeling off.      You have questions or concerns about your condition or care.    Medicines:     Antibiotics help treat the bacterial infection.       NSAIDs, such as ibuprofen, help decrease swelling, pain, and fever. NSAIDs can cause stomach bleeding or kidney problems in certain people. If you take blood thinner medicine, always ask if NSAIDs are safe for you. Always read the medicine label and follow directions. Do not give these medicines to children under 6 months of age without direction from your child's healthcare provider.      Acetaminophen decreases pain and fever. It is available without a doctor's order. Ask how much to take and how often to take it. Follow directions. Read the labels of all other medicines you are using to see if they also contain acetaminophen, or ask your doctor or pharmacist. Acetaminophen can cause liver damage if not taken correctly. Do not use more than 4 grams (4,000 milligrams) total of acetaminophen in one day.       Take your medicine as directed. Contact your healthcare provider if you think your medicine is not helping or if you have side effects. Tell him or her if you are allergic to any medicine. Keep a list of the medicines, vitamins, and herbs you take. Include the amounts, and when and why you take them. Bring the list or the pill bottles to follow-up visits. Carry your medicine list with you in case of an emergency.    Self-care:     Elevate the area above the level of your heart as often as you can. This will help decrease swelling and pain. Prop the area on pillows or blankets to keep it elevated comfortably.       Clean the area daily until the wound scabs over. Gently wash the area with soap and water. Pat dry. Use dressings as directed.       Place cool or warm, wet cloths on the area as directed. Use clean cloths and clean water. Leave it on the area until the cloth is room temperature. Pat the area dry with a clean, dry cloth. The cloths may help decrease pain.     Prevent cellulitis:     Do not scratch bug bites or areas of injury. You increase your risk for cellulitis by scratching these areas.       Do not share personal items, such as towels, clothing, and razors.       Clean exercise equipment with germ-killing  before and after you use it.      Wash your hands often. Use soap and water. Wash your hands after you use the bathroom, change a child's diapers, or sneeze. Wash your hands before you prepare or eat food. Use lotion to prevent dry, cracked skin. Handwashing           Wear pressure stockings as directed. You may be told to wear the stockings if you have peripheral edema. The stockings improve blood flow and decrease swelling.      Treat athlete’s foot. This can help prevent the spread of a bacterial skin infection.    Follow up with your healthcare provider within 3 days, or as directed: Your healthcare provider will check if your cellulitis is getting better. You may need different medicine. Write down your questions so you remember to ask them during your visits.

## 2023-06-04 NOTE — ED ADULT NURSE NOTE - OBJECTIVE STATEMENT
Pt presents to ED c/o redness, swelling and pain below the right elbow. Pt denies trauma or injury. Pt denies fever, chills, sweats. Pt reports taking Indomethacin 75mg that he is prescribed for gout with some decrease in swelling.

## 2023-06-04 NOTE — ED PROVIDER NOTE - PATIENT PORTAL LINK FT
You can access the FollowMyHealth Patient Portal offered by  by registering at the following website: [email or web address]. By joining [employer]’s FollowMyHealth portal, you will also be able to view your health information using other applications (apps) compatible with our system.

## 2023-06-04 NOTE — ED PROVIDER NOTE - NS ED ROS FT
Constitutional: No fevers, chills, or sweats.  Cardiac: No chest pain, exertional dyspnea, orthopnea  Respiratory: No shortness of breath, no cough  GI: No abdominal pain, no N/V/D  Neuro: No headaches, no neck pain/stiffness, no numbness  MSK: +right elbow swelling, redness, and pain  All other systems reviewed and are negative unless otherwise stated in the HPI.

## 2023-06-04 NOTE — ED PROVIDER NOTE - PHYSICAL EXAMINATION
General: AAOx3, NAD  HEENT: NCAT  Cardiac: Normal rate, normal peripheral perfusion  Respiratory: Normal rate and effort  GI: Soft, nondistended  Neuro: No focal deficits. CANO equally x4  MSK: Mild redness/blanching erythema to dorsal right elbow, FROM w/o tenderness, no pain w/ pronation or supination of forearm, no swelling over olecranon bursa, no bony tenderness  Skin: No rash
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
## 2023-06-04 NOTE — ED ADULT NURSE NOTE - CAS EDN DISCHARGE INTERVENTIONS
22G PIV placed in right forearm for medication administration and removed/IV discontinued, cath removed intact

## 2023-06-04 NOTE — ED PROVIDER NOTE - CLINICAL SUMMARY MEDICAL DECISION MAKING FREE TEXT BOX
Pt w/ 4 days of redness, warmth, and mild swelling of the right arm w/o traum, hx of bursitis, gout, and cellulitis. Plan to treat empirically for cellulitis given lower likely brar of gouty arthritis vs bursitis based on exam. Follow up w/ PMD or return if worsening.

## 2023-06-26 ENCOUNTER — EMERGENCY (EMERGENCY)
Facility: HOSPITAL | Age: 58
LOS: 0 days | Discharge: HOME | End: 2023-06-26
Attending: EMERGENCY MEDICINE
Payer: COMMERCIAL

## 2023-06-26 VITALS
SYSTOLIC BLOOD PRESSURE: 133 MMHG | OXYGEN SATURATION: 99 % | HEART RATE: 60 BPM | TEMPERATURE: 98 F | DIASTOLIC BLOOD PRESSURE: 89 MMHG | RESPIRATION RATE: 18 BRPM

## 2023-06-26 VITALS — HEIGHT: 75 IN | WEIGHT: 265 LBS
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
## 2023-06-26 DIAGNOSIS — M10.9 GOUT, UNSPECIFIED: ICD-10-CM
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
## 2023-06-26 DIAGNOSIS — M54.2 CERVICALGIA: ICD-10-CM

## 2023-06-26 DIAGNOSIS — S16.1XXA STRAIN OF MUSCLE, FASCIA AND TENDON AT NECK LEVEL, INITIAL ENCOUNTER: ICD-10-CM

## 2023-06-26 DIAGNOSIS — S40.012A CONTUSION OF LEFT SHOULDER, INITIAL ENCOUNTER: ICD-10-CM

## 2023-06-26 DIAGNOSIS — M25.512 PAIN IN LEFT SHOULDER: ICD-10-CM

## 2023-06-26 PROCEDURE — 99283 EMERGENCY DEPT VISIT LOW MDM: CPT

## 2023-06-26 PROCEDURE — 99284 EMERGENCY DEPT VISIT MOD MDM: CPT

## 2023-06-26 RX ORDER — IBUPROFEN 200 MG
600 TABLET ORAL ONCE
Refills: 0 | Status: COMPLETED | OUTPATIENT
Start: 2023-06-26 | End: 2023-06-26

## 2023-06-26 RX ADMIN — Medication 600 MILLIGRAM(S): at 13:16
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
## 2023-06-26 NOTE — ED STATDOCS - PATIENT PORTAL LINK FT
You can access the FollowMyHealth Patient Portal offered by [facility] by registering at the following website: [email or web address]. By joining [employer]’s FollowMyHealth portal, you will also be able to view your health information using other applications (apps) compatible with our system.
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
## 2023-06-26 NOTE — ED STATDOCS - ATTENDING APP SHARED VISIT CONTRIBUTION OF CARE
I personally saw the patient with the [facility], and completed the key components of the history and physical exam. I then discussed the management plan with the [facility].

## 2023-07-17 LAB
ALBUMIN SERPL ELPH-MCNC: 5 G/DL
ALP BLD-CCNC: 48 U/L
ALT SERPL-CCNC: 45 U/L
ANION GAP SERPL CALC-SCNC: 10 MMOL/L
AST SERPL-CCNC: 26 U/L
BILIRUB SERPL-MCNC: 1 MG/DL
BUN SERPL-MCNC: 12 MG/DL
CALCIUM SERPL-MCNC: 9.6 MG/DL
CHLORIDE SERPL-SCNC: 103 MMOL/L
CO2 SERPL-SCNC: 25 MMOL/L
CREAT SERPL-MCNC: 0.94 MG/DL
EGFR: 93 ML/MIN/1.73M2
GLUCOSE SERPL-MCNC: 96 MG/DL
POTASSIUM SERPL-SCNC: 5 MMOL/L
PROT SERPL-MCNC: 7.4 G/DL
SODIUM SERPL-SCNC: 138 MMOL/L
URATE SERPL-MCNC: 6.5 MG/DL

## 2023-07-19 ENCOUNTER — RX RENEWAL (OUTPATIENT)
Age: 58
End: 2023-07-19

## 2023-07-20 RX ORDER — FEBUXOSTAT 40 MG/1
40 TABLET ORAL
Qty: 45 | Refills: 2 | Status: ACTIVE | COMMUNITY
Start: 2023-07-20 | End: 1900-01-01

## 2023-07-20 RX ORDER — FEBUXOSTAT 40 MG/1
40 TABLET ORAL
Qty: 45 | Refills: 1 | Status: DISCONTINUED | COMMUNITY
Start: 2022-10-03 | End: 2023-07-20

## 2023-09-02 NOTE — ED STATDOCS - NSDCPRINTRESULTS_ED_ALL_ED
left UE & bilateral LEs grossly at least 3+/5 throughout
Patient requests all Lab, Cardiology, and Radiology Results on their Discharge Instructions

## 2023-12-05 ENCOUNTER — NON-APPOINTMENT (OUTPATIENT)
Age: 58
End: 2023-12-05

## 2023-12-05 ENCOUNTER — APPOINTMENT (OUTPATIENT)
Dept: DERMATOLOGY | Facility: CLINIC | Age: 58
End: 2023-12-05
Payer: COMMERCIAL

## 2023-12-05 DIAGNOSIS — D22.9 MELANOCYTIC NEVI, UNSPECIFIED: ICD-10-CM

## 2023-12-05 DIAGNOSIS — Z12.83 ENCOUNTER FOR SCREENING FOR MALIGNANT NEOPLASM OF SKIN: ICD-10-CM

## 2023-12-05 DIAGNOSIS — D18.01 HEMANGIOMA OF SKIN AND SUBCUTANEOUS TISSUE: ICD-10-CM

## 2023-12-05 PROCEDURE — 99203 OFFICE O/P NEW LOW 30 MIN: CPT

## 2023-12-06 PROBLEM — D22.9 MULTIPLE BENIGN MELANOCYTIC NEVI: Status: ACTIVE | Noted: 2023-12-06

## 2023-12-06 PROBLEM — D18.01 ANGIOMA OF SKIN: Status: ACTIVE | Noted: 2023-12-06

## 2023-12-06 PROBLEM — Z12.83 SCREENING EXAM FOR SKIN CANCER: Status: ACTIVE | Noted: 2023-12-06

## 2024-02-01 ENCOUNTER — APPOINTMENT (OUTPATIENT)
Dept: RHEUMATOLOGY | Facility: CLINIC | Age: 59
End: 2024-02-01
Payer: COMMERCIAL

## 2024-02-01 VITALS
DIASTOLIC BLOOD PRESSURE: 86 MMHG | BODY MASS INDEX: 33.45 KG/M2 | TEMPERATURE: 96.8 F | HEIGHT: 75 IN | OXYGEN SATURATION: 98 % | HEART RATE: 74 BPM | WEIGHT: 269 LBS | SYSTOLIC BLOOD PRESSURE: 128 MMHG

## 2024-02-01 DIAGNOSIS — K76.0 FATTY (CHANGE OF) LIVER, NOT ELSEWHERE CLASSIFIED: ICD-10-CM

## 2024-02-01 DIAGNOSIS — M20.40 OTHER HAMMER TOE(S) (ACQUIRED), UNSPECIFIED FOOT: ICD-10-CM

## 2024-02-01 DIAGNOSIS — M10.9 GOUT, UNSPECIFIED: ICD-10-CM

## 2024-02-01 PROCEDURE — 99214 OFFICE O/P EST MOD 30 MIN: CPT

## 2024-02-01 RX ORDER — DICLOFENAC SODIUM 10 MG/G
1 GEL TOPICAL
Qty: 1 | Refills: 3 | Status: DISCONTINUED | COMMUNITY
Start: 2020-09-18 | End: 2024-02-01

## 2024-02-01 NOTE — ASSESSMENT
[FreeTextEntry1] : 59 y/o man with hx of hypothyroidism, gout previously on allopurinol but with adverse reaction/elevated liver transaminases. He has been treating his gout flares with indomethacin PRN. He recently had another a very tiny twinge in left 1st toe.   Plan:  -off colchicine  -Refill the indomethacin 75mg ER as needed, but cautioned him about liver and kidney as well as gastric effects.  -new labs due now  -XRs b/L hands and feet negative for erosive change.  -Counseled on NSAID use and potential side effects  -Continue uloric 40mg QOD for now. Had elevated LFTs at daily dosing.  -US abd 7/13/20 reviewed. Coarse echotexture of liver may relate to fatty infiltration or hepatocellular disease. No significant changes compared to Abd US 5/3/2014.  -Diet counseling employed  -Advised podiatry for the [facility]  -ff/u 1 year
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
## 2024-02-01 NOTE — PHYSICAL EXAM
Meds verified by [facility] and placed in the reconciled medication list. Will continue to monitor. [TextEntry] :  Constitutional: Alert and in no acute distress. Eyes: Sclera and conjunctiva were normal, pupils were equal in size, round, and reactive.  Extraocular movements were in tact. ENT: Ears, and nose were normal in appearance.  The oropharynx was normal.  No oral sores. Neck: The appearance of the neck was normal, no neck mass was observed, the thyroid was not enlarged and there were no palpable thyroid nodules.  Pulmonary: No respiratory distress. Vascular: The pedal pulses are present.  There was no peripheral edema.  No color changes in the digits. Abd: Normal bowel sounds.  Soft, non-tender, non distended, no hepato-splenomegaly.  Musculoskeletal: Normal gait, no clubbing or cyanosis of the fingernails, no joint swelling seen and muscle strength and tone were normal.  B/L elbows with bony prominence no tenderness or swelling.  No TTP or active synovitis in knees, ankles, feet.  +Crepitus b/L knees.  +Hammartoe left 2rnd digit.  Skin: Normal skin color and pigmentation, normal skin turgor and no rash. No alopecia. No tophi.  No psoriasis.  Neuro: no focal deficits.  Psychiatric: Oriented to person, place, and time.  Insight and judgment were intact and the affect was normal.

## 2024-02-12 LAB
ALBUMIN SERPL ELPH-MCNC: 4.8 G/DL
ALP BLD-CCNC: 50 U/L
ALT SERPL-CCNC: 109 U/L
ANION GAP SERPL CALC-SCNC: 14 MMOL/L
AST SERPL-CCNC: 57 U/L
BILIRUB SERPL-MCNC: 1.2 MG/DL
BUN SERPL-MCNC: 11 MG/DL
CALCIUM SERPL-MCNC: 9.5 MG/DL
CHLORIDE SERPL-SCNC: 104 MMOL/L
CO2 SERPL-SCNC: 22 MMOL/L
CREAT SERPL-MCNC: 1.07 MG/DL
EGFR: 80 ML/MIN/1.73M2
GLUCOSE SERPL-MCNC: 94 MG/DL
POTASSIUM SERPL-SCNC: 5 MMOL/L
PROT SERPL-MCNC: 7.6 G/DL
SODIUM SERPL-SCNC: 140 MMOL/L
URATE SERPL-MCNC: 6.8 MG/DL

## 2024-03-07 LAB
ALT SERPL-CCNC: 101 U/L
AST SERPL-CCNC: 54 U/L
GGT SERPL-CCNC: 23 U/L
LDH SERPL-CCNC: 218 U/L

## 2024-04-04 ENCOUNTER — APPOINTMENT (OUTPATIENT)
Dept: RHEUMATOLOGY | Facility: CLINIC | Age: 59
End: 2024-04-04

## 2024-05-05 LAB
ALT SERPL-CCNC: 61 U/L
AST SERPL-CCNC: 36 U/L
URATE SERPL-MCNC: 7.3 MG/DL

## 2024-06-20 ENCOUNTER — APPOINTMENT (OUTPATIENT)
Dept: RHEUMATOLOGY | Facility: CLINIC | Age: 59
End: 2024-06-20

## 2024-11-07 ENCOUNTER — APPOINTMENT (OUTPATIENT)
Dept: INTERNAL MEDICINE | Facility: CLINIC | Age: 59
End: 2024-11-07
Payer: COMMERCIAL

## 2024-11-07 VITALS
OXYGEN SATURATION: 97 % | HEIGHT: 75 IN | DIASTOLIC BLOOD PRESSURE: 80 MMHG | TEMPERATURE: 98 F | BODY MASS INDEX: 33.07 KG/M2 | HEART RATE: 64 BPM | SYSTOLIC BLOOD PRESSURE: 128 MMHG | WEIGHT: 266 LBS

## 2024-11-07 DIAGNOSIS — E03.9 HYPOTHYROIDISM, UNSPECIFIED: ICD-10-CM

## 2024-11-07 DIAGNOSIS — Z80.0 FAMILY HISTORY OF MALIGNANT NEOPLASM OF DIGESTIVE ORGANS: ICD-10-CM

## 2024-11-07 DIAGNOSIS — Z00.00 ENCOUNTER FOR GENERAL ADULT MEDICAL EXAMINATION W/OUT ABNORMAL FINDINGS: ICD-10-CM

## 2024-11-07 DIAGNOSIS — Z80.42 FAMILY HISTORY OF MALIGNANT NEOPLASM OF PROSTATE: ICD-10-CM

## 2024-11-07 DIAGNOSIS — R79.89 OTHER SPECIFIED ABNORMAL FINDINGS OF BLOOD CHEMISTRY: ICD-10-CM

## 2024-11-07 DIAGNOSIS — R21 RASH AND OTHER NONSPECIFIC SKIN ERUPTION: ICD-10-CM

## 2024-11-07 DIAGNOSIS — K76.0 FATTY (CHANGE OF) LIVER, NOT ELSEWHERE CLASSIFIED: ICD-10-CM

## 2024-11-07 PROCEDURE — 99386 PREV VISIT NEW AGE 40-64: CPT

## 2024-11-07 RX ORDER — LEVOTHYROXINE SODIUM 0.12 MG/1
125 TABLET ORAL DAILY
Qty: 90 | Refills: 1 | Status: ACTIVE | COMMUNITY
Start: 1900-01-01 | End: 1900-01-01

## 2024-11-07 RX ORDER — NYSTATIN 100000 [USP'U]/G
100000 CREAM TOPICAL TWICE DAILY
Qty: 1 | Refills: 0 | Status: ACTIVE | COMMUNITY
Start: 2024-11-07 | End: 1900-01-01

## 2024-12-05 ENCOUNTER — APPOINTMENT (OUTPATIENT)
Dept: DERMATOLOGY | Facility: CLINIC | Age: 59
End: 2024-12-05
Payer: COMMERCIAL

## 2024-12-05 VITALS — HEIGHT: 75 IN | BODY MASS INDEX: 33.57 KG/M2 | WEIGHT: 270 LBS

## 2024-12-05 DIAGNOSIS — D18.01 HEMANGIOMA OF SKIN AND SUBCUTANEOUS TISSUE: ICD-10-CM

## 2024-12-05 DIAGNOSIS — D22.9 MELANOCYTIC NEVI, UNSPECIFIED: ICD-10-CM

## 2024-12-05 DIAGNOSIS — Z12.83 ENCOUNTER FOR SCREENING FOR MALIGNANT NEOPLASM OF SKIN: ICD-10-CM

## 2024-12-05 DIAGNOSIS — B36.0 PITYRIASIS VERSICOLOR: ICD-10-CM

## 2024-12-05 PROCEDURE — 99214 OFFICE O/P EST MOD 30 MIN: CPT

## 2024-12-05 RX ORDER — KETOCONAZOLE 20 MG/G
2 CREAM TOPICAL
Qty: 1 | Refills: 4 | Status: ACTIVE | COMMUNITY
Start: 2024-12-05 | End: 1900-01-01

## 2025-01-28 ENCOUNTER — EMERGENCY (EMERGENCY)
Facility: HOSPITAL | Age: 60
LOS: 0 days | Discharge: HOME | End: 2025-01-28
Attending: STUDENT IN AN ORGANIZED HEALTH CARE EDUCATION/TRAINING PROGRAM
Payer: COMMERCIAL

## 2025-01-28 VITALS — WEIGHT: 275.58 LBS

## 2025-01-28 VITALS
RESPIRATION RATE: 18 BRPM | OXYGEN SATURATION: 97 % | SYSTOLIC BLOOD PRESSURE: 137 MMHG | HEART RATE: 65 BPM | TEMPERATURE: 98 F | DIASTOLIC BLOOD PRESSURE: 89 MMHG

## 2025-01-28 DIAGNOSIS — M10.9 GOUT, UNSPECIFIED: ICD-10-CM

## 2025-01-28 DIAGNOSIS — T39.396A UNDERDOSING OF OTHER NONSTEROIDAL ANTI-INFLAMMATORY DRUGS [NSAID], INITIAL ENCOUNTER: ICD-10-CM

## 2025-01-28 DIAGNOSIS — M25.521 PAIN IN RIGHT ELBOW: ICD-10-CM

## 2025-01-28 DIAGNOSIS — M25.421 EFFUSION, RIGHT ELBOW: ICD-10-CM

## 2025-01-28 DIAGNOSIS — R68.83 CHILLS (WITHOUT FEVER): ICD-10-CM

## 2025-01-28 PROCEDURE — 73080 X-RAY EXAM OF ELBOW: CPT | Mod: 26,RT

## 2025-01-28 PROCEDURE — 73080 X-RAY EXAM OF ELBOW: CPT | Mod: RT

## 2025-01-28 PROCEDURE — 99284 EMERGENCY DEPT VISIT MOD MDM: CPT

## 2025-01-28 PROCEDURE — 99283 EMERGENCY DEPT VISIT LOW MDM: CPT | Mod: 25

## 2025-01-28 NOTE — ED PROVIDER NOTE - PHYSICAL EXAMINATION
GENERAL: A&Ox4, non-toxic appearing, no acute distress  HEENT: NCAT, EOMI, oral mucosa moist, normal conjunctiva  RESP: no respiratory distress, speaking in full sentences  CV: RRR, 2+ radial pulses  MSK: swelling and tenderness to the proximal dorsal ulna, limited right elbow movement in extension (cannot extend past 170 degrees), compartments soft  NEURO: no focal sensory or motor deficits, CN 2-12 grossly intact, AIN/PIN/R/U intact  SKIN: warm, normal color, well perfused, no rash  PSYCH: normal affect

## 2025-01-28 NOTE — ED ADULT NURSE NOTE - NSFALLUNIVINTERV_ED_ALL_ED
Bed/Stretcher in lowest position, wheels locked, appropriate side rails in place/Call bell, personal items and telephone in reach/Instruct patient to call for assistance before getting out of bed/chair/stretcher/Non-slip footwear applied when patient is off stretcher/[place] to call system/Physically safe environment - no spills, clutter or unnecessary equipment/Purposeful proactive rounding/Room/bathroom lighting operational, light cord in reach

## 2025-01-28 NOTE — ED PROVIDER NOTE - NSFOLLOWUPINSTRUCTIONS_ED_ALL_ED_FT
You were seen for right elbow pain. This is likely an inflammatory process. Take Indomethacin as prescribed. Follow up with an orthopedic surgeon for persistent symptoms. Return to the ED if you develop: fever, worsening pain, redness over the joint, or any other new or concerning symptoms.     Rest, ice, and elevate the affected extremity. Apply ice to the area for 20 minutes every 2 hours for the first 2 days after injury. You can expect any swelling or bruising to get a little worse in the next few days, but if you have markedly increasing pain or swelling, or any numbness, changes in sensation, weakness or any other concerns please return to the emergency department immediately.

## 2025-01-28 NOTE — ED PROVIDER NOTE - OBJECTIVE STATEMENT
59-year-old male PMH gout, bursitis, cellulitis, presents to the emergency department for right elbow pain.  States he noticed pain and swelling that started this weekend, gradually becoming worse.  Patient is right-hand dominant.  Has had recurrent bursitis, cellulitis, and gout in the same elbow.  Never had aspiration performed.  Currently on indomethacin, did not take today.  He endorses chills but denies fever, weakness, or paresthesias.

## 2025-01-28 NOTE — ED PROVIDER NOTE - CLINICAL SUMMARY MEDICAL DECISION MAKING FREE TEXT BOX
59-year-old male presenting with right elbow pain and swelling for the past 4 days.  Has a history of gout, bursitis, and cellulitis.  Right elbow is warm to the touch, limited extension to 170 degrees, tender over the proximal ulna with swelling.  Differential including but not limited to: Tendinitis, gout, bursitis, other inflammatory arthropathy.  No pain with movement, less likely septic joint.  No overlying skin erythema, unlikely cellulitis.  Plan for x-ray, compression, ice, symptomatic treatment.  Anticipate discharge with conservative management and orthopedic follow-up.

## 2025-01-28 NOTE — ED PROVIDER NOTE - PATIENT PORTAL LINK FT
You can access the FollowMyHealth Patient Portal offered by [facility] by registering at the following website: [email or web address]. By joining [facility]’s FollowMyHealth portal, you will also be able to view your health information using other applications (apps) compatible with our system.

## 2025-01-30 ENCOUNTER — APPOINTMENT (OUTPATIENT)
Dept: RHEUMATOLOGY | Facility: CLINIC | Age: 60
End: 2025-01-30
Payer: COMMERCIAL

## 2025-01-30 VITALS
HEIGHT: 75 IN | OXYGEN SATURATION: 98 % | WEIGHT: 270 LBS | DIASTOLIC BLOOD PRESSURE: 76 MMHG | TEMPERATURE: 98 F | HEART RATE: 68 BPM | SYSTOLIC BLOOD PRESSURE: 114 MMHG | BODY MASS INDEX: 33.57 KG/M2

## 2025-01-30 DIAGNOSIS — R74.01 ELEVATION OF LEVELS OF LIVER TRANSAMINASE LEVELS: ICD-10-CM

## 2025-01-30 DIAGNOSIS — M10.9 GOUT, UNSPECIFIED: ICD-10-CM

## 2025-01-30 DIAGNOSIS — Z78.9 OTHER SPECIFIED HEALTH STATUS: ICD-10-CM

## 2025-01-30 PROCEDURE — 99214 OFFICE O/P EST MOD 30 MIN: CPT

## 2025-02-04 ENCOUNTER — EMERGENCY (EMERGENCY)
Facility: HOSPITAL | Age: 60
LOS: 0 days | Discharge: HOME | End: 2025-02-04
Attending: EMERGENCY MEDICINE
Payer: COMMERCIAL

## 2025-02-04 VITALS
DIASTOLIC BLOOD PRESSURE: 94 MMHG | OXYGEN SATURATION: 99 % | WEIGHT: 273.59 LBS | HEIGHT: 63 IN | RESPIRATION RATE: 18 BRPM | HEART RATE: 98 BPM | TEMPERATURE: 99 F | SYSTOLIC BLOOD PRESSURE: 128 MMHG

## 2025-02-04 VITALS
RESPIRATION RATE: 18 BRPM | DIASTOLIC BLOOD PRESSURE: 87 MMHG | TEMPERATURE: 99 F | SYSTOLIC BLOOD PRESSURE: 123 MMHG | HEART RATE: 71 BPM | OXYGEN SATURATION: 99 %

## 2025-02-04 DIAGNOSIS — R06.02 SHORTNESS OF BREATH: ICD-10-CM

## 2025-02-04 DIAGNOSIS — E03.9 HYPOTHYROIDISM, UNSPECIFIED: ICD-10-CM

## 2025-02-04 DIAGNOSIS — R79.89 OTHER SPECIFIED ABNORMAL FINDINGS OF BLOOD CHEMISTRY: ICD-10-CM

## 2025-02-04 DIAGNOSIS — R00.2 PALPITATIONS: ICD-10-CM

## 2025-02-04 DIAGNOSIS — R51.9 HEADACHE, UNSPECIFIED: ICD-10-CM

## 2025-02-04 DIAGNOSIS — R42 DIZZINESS AND GIDDINESS: ICD-10-CM

## 2025-02-04 LAB
ALBUMIN SERPL ELPH-MCNC: 4.3 G/DL — SIGNIFICANT CHANGE UP (ref 3.3–5)
ALP SERPL-CCNC: 50 U/L — SIGNIFICANT CHANGE UP (ref 40–120)
ALT FLD-CCNC: 76 U/L — SIGNIFICANT CHANGE UP (ref 12–78)
ANION GAP SERPL CALC-SCNC: 4 MMOL/L — LOW (ref 5–17)
AST SERPL-CCNC: 41 U/L — HIGH (ref 15–37)
BASOPHILS # BLD AUTO: 0.05 K/UL — SIGNIFICANT CHANGE UP (ref 0–0.2)
BASOPHILS NFR BLD AUTO: 0.7 % — SIGNIFICANT CHANGE UP (ref 0–2)
BILIRUB SERPL-MCNC: 0.8 MG/DL — SIGNIFICANT CHANGE UP (ref 0.2–1.2)
BUN SERPL-MCNC: 13 MG/DL — SIGNIFICANT CHANGE UP (ref 7–23)
CALCIUM SERPL-MCNC: 9.7 MG/DL — SIGNIFICANT CHANGE UP (ref 8.5–10.1)
CHLORIDE SERPL-SCNC: 107 MMOL/L — SIGNIFICANT CHANGE UP (ref 96–108)
CO2 SERPL-SCNC: 25 MMOL/L — SIGNIFICANT CHANGE UP (ref 22–31)
CREAT SERPL-MCNC: 1.08 MG/DL — SIGNIFICANT CHANGE UP (ref 0.5–1.3)
D DIMER BLD IA.RAPID-MCNC: 442 NG/ML DDU — HIGH
EGFR: 79 ML/MIN/1.73M2 — SIGNIFICANT CHANGE UP
EOSINOPHIL # BLD AUTO: 0.2 K/UL — SIGNIFICANT CHANGE UP (ref 0–0.5)
EOSINOPHIL NFR BLD AUTO: 2.7 % — SIGNIFICANT CHANGE UP (ref 0–6)
FLUAV AG NPH QL: SIGNIFICANT CHANGE UP
FLUBV AG NPH QL: SIGNIFICANT CHANGE UP
GLUCOSE SERPL-MCNC: 117 MG/DL — HIGH (ref 70–99)
HCT VFR BLD CALC: 49.2 % — SIGNIFICANT CHANGE UP (ref 39–50)
HGB BLD-MCNC: 16.5 G/DL — SIGNIFICANT CHANGE UP (ref 13–17)
IMM GRANULOCYTES NFR BLD AUTO: 0.4 % — SIGNIFICANT CHANGE UP (ref 0–0.9)
LIDOCAIN IGE QN: 29 U/L — SIGNIFICANT CHANGE UP (ref 13–75)
LYMPHOCYTES # BLD AUTO: 1.74 K/UL — SIGNIFICANT CHANGE UP (ref 1–3.3)
LYMPHOCYTES # BLD AUTO: 23.5 % — SIGNIFICANT CHANGE UP (ref 13–44)
MAGNESIUM SERPL-MCNC: 2.2 MG/DL — SIGNIFICANT CHANGE UP (ref 1.6–2.6)
MCHC RBC-ENTMCNC: 30.7 PG — SIGNIFICANT CHANGE UP (ref 27–34)
MCHC RBC-ENTMCNC: 33.5 G/DL — SIGNIFICANT CHANGE UP (ref 32–36)
MCV RBC AUTO: 91.6 FL — SIGNIFICANT CHANGE UP (ref 80–100)
MONOCYTES # BLD AUTO: 0.51 K/UL — SIGNIFICANT CHANGE UP (ref 0–0.9)
MONOCYTES NFR BLD AUTO: 6.9 % — SIGNIFICANT CHANGE UP (ref 2–14)
NEUTROPHILS # BLD AUTO: 4.89 K/UL — SIGNIFICANT CHANGE UP (ref 1.8–7.4)
NEUTROPHILS NFR BLD AUTO: 65.8 % — SIGNIFICANT CHANGE UP (ref 43–77)
NT-PROBNP SERPL-SCNC: 49 PG/ML — SIGNIFICANT CHANGE UP (ref 0–125)
PLATELET # BLD AUTO: 323 K/UL — SIGNIFICANT CHANGE UP (ref 150–400)
POTASSIUM SERPL-MCNC: 4.6 MMOL/L — SIGNIFICANT CHANGE UP (ref 3.5–5.3)
POTASSIUM SERPL-SCNC: 4.6 MMOL/L — SIGNIFICANT CHANGE UP (ref 3.5–5.3)
PROT SERPL-MCNC: 8.5 GM/DL — HIGH (ref 6–8.3)
RBC # BLD: 5.37 M/UL — SIGNIFICANT CHANGE UP (ref 4.2–5.8)
RBC # FLD: 13 % — SIGNIFICANT CHANGE UP (ref 10.3–14.5)
RSV RNA NPH QL NAA+NON-PROBE: SIGNIFICANT CHANGE UP
SARS-COV-2 RNA SPEC QL NAA+PROBE: SIGNIFICANT CHANGE UP
SODIUM SERPL-SCNC: 136 MMOL/L — SIGNIFICANT CHANGE UP (ref 135–145)
TROPONIN I, HIGH SENSITIVITY RESULT: 9.1 NG/L — SIGNIFICANT CHANGE UP
TSH SERPL-MCNC: 0.55 UU/ML — SIGNIFICANT CHANGE UP (ref 0.34–4.82)
WBC # BLD: 7.42 K/UL — SIGNIFICANT CHANGE UP (ref 3.8–10.5)
WBC # FLD AUTO: 7.42 K/UL — SIGNIFICANT CHANGE UP (ref 3.8–10.5)

## 2025-02-04 PROCEDURE — 83880 ASSAY OF NATRIURETIC PEPTIDE: CPT

## 2025-02-04 PROCEDURE — 99285 EMERGENCY DEPT VISIT HI MDM: CPT

## 2025-02-04 PROCEDURE — 93010 ELECTROCARDIOGRAM REPORT: CPT

## 2025-02-04 PROCEDURE — 85379 FIBRIN DEGRADATION QUANT: CPT

## 2025-02-04 PROCEDURE — 83735 ASSAY OF MAGNESIUM: CPT

## 2025-02-04 PROCEDURE — 71275 CT ANGIOGRAPHY CHEST: CPT | Mod: 26

## 2025-02-04 PROCEDURE — 99284 EMERGENCY DEPT VISIT MOD MDM: CPT

## 2025-02-04 PROCEDURE — 84484 ASSAY OF TROPONIN QUANT: CPT

## 2025-02-04 PROCEDURE — 93005 ELECTROCARDIOGRAM TRACING: CPT

## 2025-02-04 PROCEDURE — 71045 X-RAY EXAM CHEST 1 VIEW: CPT

## 2025-02-04 PROCEDURE — 85025 COMPLETE CBC W/AUTO DIFF WBC: CPT

## 2025-02-04 PROCEDURE — 71275 CT ANGIOGRAPHY CHEST: CPT | Mod: MC

## 2025-02-04 PROCEDURE — 0241U: CPT

## 2025-02-04 PROCEDURE — 83690 ASSAY OF LIPASE: CPT

## 2025-02-04 PROCEDURE — 80053 COMPREHEN METABOLIC PANEL: CPT

## 2025-02-04 PROCEDURE — 84443 ASSAY THYROID STIM HORMONE: CPT

## 2025-02-04 PROCEDURE — 71045 X-RAY EXAM CHEST 1 VIEW: CPT | Mod: 26

## 2025-02-04 PROCEDURE — 36415 COLL VENOUS BLD VENIPUNCTURE: CPT

## 2025-02-04 PROCEDURE — 99285 EMERGENCY DEPT VISIT HI MDM: CPT | Mod: 25

## 2025-02-04 RX ORDER — ASPIRIN 81 MG/1
324 TABLET, COATED ORAL ONCE
Refills: 0 | Status: COMPLETED | OUTPATIENT
Start: 2025-02-04 | End: 2025-02-04

## 2025-02-04 NOTE — ED ADULT TRIAGE NOTE - CHIEF COMPLAINT QUOTE
ambulatory into ED with c/o shortness of breath accompanied by lightheadedness and feeling hot while working on a puzzle. no known cardiac or pulmonary dx.

## 2025-02-04 NOTE — ED ADULT NURSE REASSESSMENT NOTE - NS ED NURSE REASSESS COMMENT FT1
Patient is refusing to take aspirin at this time. Patient educated if he changes his mind and wants the aspirin to let me know. Pt verbalized understanding.

## 2025-02-04 NOTE — ED STATDOCS - OBJECTIVE STATEMENT
58 y/o M with PMHx of hypothyroidism presents to ED c/o headache while waking up this morning. Endorses 1 episode of feeling of  "heart flutter", lightheadedness and feeling hot while working on a puzzle this morning. Pt states he felt SOB when he walked up stairs this morning. Did not eat breakfast this morning but that is normal for him per pt. Denies cough, fever, chest pain. Denies cardiac hx , lung hx. Denies smoking use, ETOH or drug abuse. Pt states he had a cold 1 week ago. Pt went to [facility] 1 month ago. Pt takes levothyroxine.  Cardio Dr Isaac 58 y/o M with PMHx of hypothyroidism presents to ED c/o headache while waking up this morning. Endorses 1 episode of feeling of  "heart flutter", lightheadedness and feeling hot while working on a puzzle this morning after drinking 1 cup of coffee. Pt states he felt SOB when he walked up stairs this morning. Did not eat breakfast this morning but that is normal for him per pt. Denies cough, fever, chest pain. Denies cardiac hx , lung hx. Denies smoking use, ETOH or drug abuse. Pt states he had a cold 1 week ago. Pt went to [facility] 1 month ago. Pt takes levothyroxine.  Cardio Dr Isaac

## 2025-02-04 NOTE — ED STATDOCS - NSICDXNOPASTSURGICALHX_GEN_ALL_ED
Subjective   Jesus Collins is a 11 y.o. male is here today for medication management follow-up.presents with his mother and sister.    Chief Complaint:  Recheck on behaviors and anxiety.     History of Present Illness:  Pt will going into 7th grade at [employer].  Mom states that his anxiety is much improved on the increase of the Zoloft.  He has not had any anger outbursts.  Mom states that it has been quite some time and she really cannot even remember the last 1.  He is having some issues sleeping but mom states this is more due to being off of the schedule and sleeping late in the day.  He denies any depression.  Denies any thoughts to hurt himself or hearing any voices.  He does state that he continues with some anxiety.  Mom feels like the medication has helped and it is manageable.  He does seem to have some OCD traits    He gets obsessed on sports and the mom states that it is constant talking about players etc. and stats.  Has to organize his cards and no one dan touch them or it really upsets him.  .  Has to have a certain type of blankets to go to sleep.  As if he does not have the type of blankets he likes it will take him 2 or 3 hours as he thinks about it.. Washes hands excessively Mom states that this was occurring prior to the COVID 19 pandemic.  No negative side effects to the medication.  No acute medical stressors.Body mass index is 33.01 kg/m². appetite is good.  States that he is impatient, loses things frequently.  Does have some trouble completing tasks.  Rates were straight A's last year.  States that he does obsess and worry about his grades when he is in school as well.    The following portions of the patient's history were reviewed and updated as appropriate: allergies, current medications, past family history, past medical history, past social history, past surgical history and problem list.    Review of Systems   Constitutional: Negative for activity change and  "appetite change.   HENT: Negative.    Eyes: Negative for visual disturbance.   Respiratory: Negative.    Cardiovascular: Negative.    Gastrointestinal: Negative.    Endocrine: Negative.    Genitourinary: Negative for enuresis.   Musculoskeletal: Negative for arthralgias.   Skin: Negative.    Allergic/Immunologic: Negative.    Neurological: Negative for dizziness, seizures and headaches.   Hematological: Negative.    Psychiatric/Behavioral: Negative for agitation, behavioral problems, confusion, decreased concentration, dysphoric mood, hallucinations, self-injury, sleep disturbance and suicidal ideas. The patient is nervous/anxious. The patient is not hyperactive.        Objective   Physical Exam   Constitutional: He appears well-developed and well-nourished.   Pleasant and engaging   Eyes: Pupils are equal, round, and reactive to light.   Neck: Normal range of motion.   Vitals reviewed.    Blood pressure (!) 139/82, pulse 80, temperature 97.3 °F (36.3 °C), height 152.4 cm (60\"), weight 76.7 kg (169 lb).    Medication List:   Current Outpatient Medications   Medication Sig Dispense Refill   • ADVAIR DISKUS 100-50 MCG/DOSE DISKUS      • albuterol (ACCUNEB) 1.25 MG/3ML nebulizer solution USE 1 VIAL IN NEBULIZER THREE OR FOUR TIMES DAILY AS NEEDED  2   • BISACODYL 5 MG EC tablet      • cetirizine (ZyrTEC) 1 MG/ML syrup      • fluticasone (FLONASE) 50 MCG/ACT nasal spray use 1 SPRAY nasally EVERY DAY  0   • fluticasone-salmeterol (ADVAIR DISKUS) 100-50 MCG/DOSE DISKUS Inhale 1 puff Every 12 (Twelve) Hours.     • lactulose (CHRONULAC) 10 GM/15ML solution      • sertraline (ZOLOFT) 20 MG/ML concentrated solution Take 2.5 mL by mouth Daily. 39 mL 1   • valproate (DEPAKENE) 250 MG/5ML solution Take 5 mL by mouth Daily. One teaspoon daily 150 mL 1     No current facility-administered medications for this visit.        Mental Status Exam:   Hygiene:   good  Cooperation:  Cooperative  Eye Contact:  Good  Psychomotor Behavior:  " Appropriate  Affect:  Full range  Hopelessness: Denies  Speech:  Normal  Thought Process:  Goal directed  Thought Content:  Normal  Suicidal:  None  Homicidal:  None  Hallucinations:  None  Delusion:  None  Memory:  Intact  Orientation:  Person, Place, Time and Situation  Reliability:  fair  Insight:  Fair  Judgement:  Fair  Impulse Control:  Fair  Physical/Medical Issues:  Yes colon issues and asthma    Assessment/Plan   Problems Addressed this Visit     None      Visit Diagnoses     Anxiety    -  Primary    Relevant Medications    valproate (DEPAKENE) 250 MG/5ML solution    sertraline (ZOLOFT) 20 MG/ML concentrated solution    Intermittent explosive disorder        Relevant Medications    valproate (DEPAKENE) 250 MG/5ML solution    sertraline (ZOLOFT) 20 MG/ML concentrated solution    ADHD (attention deficit hyperactivity disorder), combined type        Relevant Medications    sertraline (ZOLOFT) 20 MG/ML concentrated solution    Other Relevant Orders    ECG 12 Lead          Functionality: pt having minimal impairment in important areas of daily functioning.  Prognosis: Good dependent on medication/follow up and treatment plan compliance.  CPT 3 testing shows 3 atypical t scores indicating ADHD.  Results were discussed with mother    Lengthy discussion with mom on patient's diagnosis as well as treatment plan.  When he was younger he was having the large outburst which he is not having now.  I am going to decrease his Depakote today as I do not believe he needs to be on 500 mg.  Hope is that we will be able to get him off of the Depakote.  Mom is going to see how things go as far as school starting.  We discussed how his ADHD may be contributing to the anxiety.  For now mom is pleased with his anxiety level.  If it were to increase when school begins we will see if maybe trying to treat the ADHD at that time may benefit it.  We will see.  We discussed possibly adding a Strattera or an Intuniv at that time if  needed.  I am also going to go ahead and order an EKG as baseline in case at some point he does need the stimulant or the Strattera. We also had lengthy discussion on ADHD and what it is and the positive aspects associated with it.  He has an IEP in place for anxiety and physical issues.    Decreasing the depakote to 5ml daily.  Continuing the zoloft at present dosing.  Continue therapy with [facility]. Continuing efforts to promote the therapeutic alliance, address the patient's issues, and strengthen self awareness, insights, and coping skills  .  Mother is aware to call 911 or go to the nearest ER should begin having SI/HI. RTC 8 weeks.               This document has been electronically signed by VELMA Rodriguez on   August 5, 2020 15:00.       <-- Click to add NO significant Past Surgical History

## 2025-02-04 NOTE — ED ADULT NURSE NOTE - OBJECTIVE STATEMENT
presents to ED c/o headache while waking up this morning. Endorses 1 episode of feeling of  "heart flutter", lightheadedness and feeling hot while working on a puzzle this morning after drinking 1 cup of coffee. Pt states he felt SOB when he walked up stairs this morning. Did not eat breakfast this morning but that is normal for him per pt. Denies cough, fever, chest pain. Denies cardiac hx , lung hx.

## 2025-02-04 NOTE — ED STATDOCS - PHYSICAL EXAMINATION
Constitutional: NAD AOx3  Eyes: PERRL EOMI  Head: Normocephalic atraumatic  Mouth: MMM  Cardiac: regular rate and rhythm  Resp: Lungs CTAB  GI: Abd s/nd/nt  Neuro: CN2-12 grossly intact, CANO x 4  Skin: No visible rashes  Ext: no edema

## 2025-02-04 NOTE — ED STATDOCS - PROGRESS NOTE DETAILS
59-year-old male with a past medical history of hypothyroidism presents with episodes of shortness of breath and palpitations that started this morning.  Patient states that after he woke up he drank his coffee, was doing his puzzle and had 2 episodes of palpitations with shortness of breath.  Patient states that he has never had this before.  Patient states that he recently got over a cold about a week ago and does not feel like he has any flu/cold-like symptoms at this moment.  Patient also did travel to and from [place] about a month ago.  Denies chest pain, cough, history of PE/DVT.  EKG unremarkable.  Vitals within normal limits.  Heart rate is 98 and O2 saturation is 90% on room air.  Lungs clear to auscultation bilaterally.  Heart regular rate and rhythm.  Will check labs including troponin, D-dimer, TSH, swab for flu/COVID, chest x-ray and reevaluate.  PMD = Dr. Benitez  Cardiologist = Dr. Isaac. -Alexander Lama PA-C D-dimer has been slightly elevated at 442.  Will order CT angio of the chest to rule out PE.  Remainder of labs including white blood cell count, electrolytes, kidney function, troponin, TSH are also unremarkable.  Patient and wife were made aware of the results and the plan. -Alexander Lama PA-C Informed patient of his results including a negative troponin, and elevated D-dimer where a CTA was performed and did not show any evidence of PEs but did show some pulmonary nodules on the right lung patient will need to follow-up with as an outpatient.  Remainder of labs are also unremarkable.  Spoke with Dr. Isaac who was aware of the results and the case and states that if the patient is stable patient can be followed up as outpatient and he can get him into the office.  Patient and wife were made aware of the plan and agreed to be discharged home. -Alexander Lama PA-C

## 2025-02-04 NOTE — ED STATDOCS - PATIENT PORTAL LINK FT
You can access the FollowMyHealth Patient Portal offered by [facility] by registering at the following website: [email or web address]. By joining [facility]’s FollowMyHealth portal, you will also be able to view your health information using other applications (apps) compatible with our system.

## 2025-03-11 ENCOUNTER — APPOINTMENT (OUTPATIENT)
Dept: PULMONOLOGY | Facility: CLINIC | Age: 60
End: 2025-03-11

## 2025-03-11 ENCOUNTER — APPOINTMENT (OUTPATIENT)
Dept: INTERNAL MEDICINE | Facility: CLINIC | Age: 60
End: 2025-03-11
Payer: COMMERCIAL

## 2025-03-11 VITALS
HEART RATE: 69 BPM | RESPIRATION RATE: 16 BRPM | OXYGEN SATURATION: 99 % | WEIGHT: 273 LBS | HEIGHT: 74 IN | TEMPERATURE: 98.1 F | BODY MASS INDEX: 35.04 KG/M2 | SYSTOLIC BLOOD PRESSURE: 162 MMHG | DIASTOLIC BLOOD PRESSURE: 86 MMHG

## 2025-03-11 DIAGNOSIS — G47.30 SLEEP APNEA, UNSPECIFIED: ICD-10-CM

## 2025-03-11 DIAGNOSIS — R91.1 SOLITARY PULMONARY NODULE: ICD-10-CM

## 2025-03-11 PROCEDURE — 94729 DIFFUSING CAPACITY: CPT

## 2025-03-11 PROCEDURE — 94060 EVALUATION OF WHEEZING: CPT

## 2025-03-11 PROCEDURE — ZZZZZ: CPT

## 2025-03-11 PROCEDURE — 99204 OFFICE O/P NEW MOD 45 MIN: CPT | Mod: 25

## 2025-03-11 PROCEDURE — 94727 GAS DIL/WSHOT DETER LNG VOL: CPT

## 2025-03-31 ENCOUNTER — OUTPATIENT (OUTPATIENT)
Dept: OUTPATIENT SERVICES | Facility: HOSPITAL | Age: 60
LOS: 1 days | End: 2025-03-31

## 2025-03-31 DIAGNOSIS — G47.33 OBSTRUCTIVE SLEEP APNEA (ADULT) (PEDIATRIC): ICD-10-CM

## 2025-07-30 ENCOUNTER — NON-APPOINTMENT (OUTPATIENT)
Age: 60
End: 2025-07-30

## 2025-07-31 ENCOUNTER — APPOINTMENT (OUTPATIENT)
Dept: RHEUMATOLOGY | Facility: CLINIC | Age: 60
End: 2025-07-31

## 2025-08-07 ENCOUNTER — APPOINTMENT (OUTPATIENT)
Dept: RHEUMATOLOGY | Facility: CLINIC | Age: 60
End: 2025-08-07
Payer: COMMERCIAL

## 2025-08-07 VITALS
BODY MASS INDEX: 33.75 KG/M2 | DIASTOLIC BLOOD PRESSURE: 83 MMHG | WEIGHT: 263 LBS | OXYGEN SATURATION: 97 % | SYSTOLIC BLOOD PRESSURE: 133 MMHG | HEIGHT: 74 IN | HEART RATE: 71 BPM

## 2025-08-07 DIAGNOSIS — R74.01 ELEVATION OF LEVELS OF LIVER TRANSAMINASE LEVELS: ICD-10-CM

## 2025-08-07 DIAGNOSIS — Z78.9 OTHER SPECIFIED HEALTH STATUS: ICD-10-CM

## 2025-08-07 DIAGNOSIS — M10.9 GOUT, UNSPECIFIED: ICD-10-CM

## 2025-08-07 PROCEDURE — 99214 OFFICE O/P EST MOD 30 MIN: CPT

## 2025-08-07 PROCEDURE — 36415 COLL VENOUS BLD VENIPUNCTURE: CPT

## 2025-08-11 LAB
ALBUMIN SERPL ELPH-MCNC: 4.9 G/DL
ALP BLD-CCNC: 53 U/L
ALT SERPL-CCNC: 53 U/L
ANION GAP SERPL CALC-SCNC: 13 MMOL/L
AST SERPL-CCNC: 36 U/L
BILIRUB SERPL-MCNC: 0.9 MG/DL
BUN SERPL-MCNC: 13 MG/DL
CALCIUM SERPL-MCNC: 9.7 MG/DL
CHLORIDE SERPL-SCNC: 104 MMOL/L
CO2 SERPL-SCNC: 24 MMOL/L
CREAT SERPL-MCNC: 1.04 MG/DL
EGFRCR SERPLBLD CKD-EPI 2021: 82 ML/MIN/1.73M2
GLUCOSE SERPL-MCNC: 89 MG/DL
POTASSIUM SERPL-SCNC: 4.5 MMOL/L
PROT SERPL-MCNC: 7.9 G/DL
SODIUM SERPL-SCNC: 141 MMOL/L
URATE SERPL-MCNC: 8.9 MG/DL

## 2025-09-09 ENCOUNTER — APPOINTMENT (OUTPATIENT)
Dept: PULMONOLOGY | Facility: CLINIC | Age: 60
End: 2025-09-09